# Patient Record
Sex: MALE | Race: WHITE | Employment: OTHER | URBAN - METROPOLITAN AREA
[De-identification: names, ages, dates, MRNs, and addresses within clinical notes are randomized per-mention and may not be internally consistent; named-entity substitution may affect disease eponyms.]

---

## 2017-03-18 ENCOUNTER — OFFICE VISIT (OUTPATIENT)
Dept: FAMILY MEDICINE CLINIC | Facility: CLINIC | Age: 62
End: 2017-03-18

## 2017-03-18 VITALS
RESPIRATION RATE: 16 BRPM | SYSTOLIC BLOOD PRESSURE: 124 MMHG | WEIGHT: 204 LBS | TEMPERATURE: 98 F | BODY MASS INDEX: 27.63 KG/M2 | HEIGHT: 72 IN | DIASTOLIC BLOOD PRESSURE: 80 MMHG | HEART RATE: 70 BPM

## 2017-03-18 DIAGNOSIS — M79.671 PAIN IN BOTH FEET: ICD-10-CM

## 2017-03-18 DIAGNOSIS — L53.9 TOE ERYTHEMA: Primary | ICD-10-CM

## 2017-03-18 DIAGNOSIS — M79.672 PAIN IN BOTH FEET: ICD-10-CM

## 2017-03-18 DIAGNOSIS — I10 ESSENTIAL HYPERTENSION: ICD-10-CM

## 2017-03-18 DIAGNOSIS — M79.89 TOE SWELLING: ICD-10-CM

## 2017-03-18 PROCEDURE — 99214 OFFICE O/P EST MOD 30 MIN: CPT | Performed by: FAMILY MEDICINE

## 2017-03-18 NOTE — PROGRESS NOTES
Victoriano Curry is a 59year old male. cc pain in the feet, swelling, erythema, hypertension  HPI:   Patient is coming for evaluation of his feet condition. He noticed that since the wintertime he has sensation of swelling if his toe.   They feel bruised tin normal mood    EXAM:   /80 mmHg  Pulse 70  Temp(Src) 97.8 °F (36.6 °C) (Oral)  Resp 16  Ht 72\"  Wt 204 lb  BMI 27.66 kg/m2  GENERAL: well developed, well nourished,in no apparent distress  SKIN: no rashes,no suspicious lesions  HEENT: atraumatic, no

## 2017-03-18 NOTE — PATIENT INSTRUCTIONS
Return for blood work due in the morning. Call [phone] to schedule BRIGITTE testing ankle-brachial index. Wear warm socks especially will socks when going outside for a walk. Good hydration. Continue current medication for blood pressure.

## 2017-03-20 ENCOUNTER — HOSPITAL ENCOUNTER (OUTPATIENT)
Dept: ULTRASOUND IMAGING | Facility: HOSPITAL | Age: 62
Discharge: HOME | End: 2017-03-20
Attending: FAMILY MEDICINE
Payer: COMMERCIAL

## 2017-03-20 DIAGNOSIS — I10 ESSENTIAL HYPERTENSION: ICD-10-CM

## 2017-03-20 DIAGNOSIS — L53.9 TOE ERYTHEMA: ICD-10-CM

## 2017-03-20 DIAGNOSIS — M79.672 PAIN IN BOTH FEET: ICD-10-CM

## 2017-03-20 DIAGNOSIS — M79.671 PAIN IN BOTH FEET: ICD-10-CM

## 2017-03-20 DIAGNOSIS — M79.89 TOE SWELLING: ICD-10-CM

## 2017-03-20 PROCEDURE — 93922 UPR/L XTREMITY ART 2 LEVELS: CPT

## 2017-04-13 ENCOUNTER — OFFICE VISIT (OUTPATIENT)
Dept: FAMILY MEDICINE CLINIC | Facility: CLINIC | Age: 62
End: 2017-04-13

## 2017-04-13 ENCOUNTER — LAB ENCOUNTER (OUTPATIENT)
Dept: LAB | Age: 62
End: 2017-04-13
Attending: FAMILY MEDICINE
Payer: COMMERCIAL

## 2017-04-13 VITALS
TEMPERATURE: 97 F | HEIGHT: 72.5 IN | SYSTOLIC BLOOD PRESSURE: 114 MMHG | HEART RATE: 59 BPM | DIASTOLIC BLOOD PRESSURE: 68 MMHG | BODY MASS INDEX: 26.8 KG/M2 | WEIGHT: 200 LBS | RESPIRATION RATE: 16 BRPM

## 2017-04-13 DIAGNOSIS — M79.671 RIGHT FOOT PAIN: ICD-10-CM

## 2017-04-13 DIAGNOSIS — L53.9 TOE ERYTHEMA: ICD-10-CM

## 2017-04-13 DIAGNOSIS — I10 ESSENTIAL HYPERTENSION: ICD-10-CM

## 2017-04-13 DIAGNOSIS — I10 ESSENTIAL HYPERTENSION, MALIGNANT: ICD-10-CM

## 2017-04-13 DIAGNOSIS — M79.672 LEFT FOOT PAIN: ICD-10-CM

## 2017-04-13 DIAGNOSIS — L53.9 TOE ERYTHEMA: Primary | ICD-10-CM

## 2017-04-13 DIAGNOSIS — M79.89 TOE SWELLING: ICD-10-CM

## 2017-04-13 DIAGNOSIS — M79.671 PAIN IN BOTH FEET: ICD-10-CM

## 2017-04-13 DIAGNOSIS — R73.9 HYPERGLYCEMIA: ICD-10-CM

## 2017-04-13 DIAGNOSIS — L53.9 ERYTHEMATOUS CONDITION: Primary | ICD-10-CM

## 2017-04-13 DIAGNOSIS — M79.672 PAIN IN BOTH FEET: ICD-10-CM

## 2017-04-13 DIAGNOSIS — M79.89 SWELLING OF LIMB: ICD-10-CM

## 2017-04-13 PROCEDURE — 86140 C-REACTIVE PROTEIN: CPT | Performed by: FAMILY MEDICINE

## 2017-04-13 PROCEDURE — 85652 RBC SED RATE AUTOMATED: CPT | Performed by: FAMILY MEDICINE

## 2017-04-13 PROCEDURE — 36415 COLL VENOUS BLD VENIPUNCTURE: CPT | Performed by: FAMILY MEDICINE

## 2017-04-13 PROCEDURE — 99214 OFFICE O/P EST MOD 30 MIN: CPT | Performed by: FAMILY MEDICINE

## 2017-04-13 PROCEDURE — 80053 COMPREHEN METABOLIC PANEL: CPT | Performed by: FAMILY MEDICINE

## 2017-04-13 PROCEDURE — 86431 RHEUMATOID FACTOR QUANT: CPT | Performed by: FAMILY MEDICINE

## 2017-04-13 PROCEDURE — 86225 DNA ANTIBODY NATIVE: CPT | Performed by: FAMILY MEDICINE

## 2017-04-13 PROCEDURE — 85025 COMPLETE CBC W/AUTO DIFF WBC: CPT | Performed by: FAMILY MEDICINE

## 2017-04-13 PROCEDURE — 86038 ANTINUCLEAR ANTIBODIES: CPT | Performed by: FAMILY MEDICINE

## 2017-04-13 RX ORDER — LISINOPRIL 10 MG/1
10 TABLET ORAL DAILY
Qty: 90 TABLET | Refills: 1 | Status: SHIPPED | OUTPATIENT
Start: 2017-04-13 | End: 2017-10-07

## 2017-04-13 NOTE — PROGRESS NOTES
Sheba Mathews is a 59year old male. cc hypertension, skin changes of the feet, cold feet  HPI:   HTN - doing well with medications. No side effects. BP is controlled at home. Takes meds regularly. Needs refills. No chest pain, No SOB, no palpitations. headaches  Skeletal erythema toes bilateral feet  Psych normal mood.     EXAM:   /68 mmHg  Pulse 59  Temp(Src) 97.3 °F (36.3 °C) (Oral)  Resp 16  Ht 72.5\"  Wt 200 lb  BMI 26.74 kg/m2  GENERAL: well developed, well nourished,in no apparent distress  S ASSESSMENT AND PLAN:   Toe erythema  (primary encounter diagnosis)  Essential hypertension  Hyperglycemia    No orders of the defined types were placed in this encounter.        Meds & Refills for this Visit:  Signed Prescriptions Disp Refills    kelsey

## 2017-04-13 NOTE — PATIENT INSTRUCTIONS
Continue current meds. Watch diet for fats and carbs. Stay active. Return for additional blood work for rheumatologic tests.

## 2017-04-14 ENCOUNTER — TELEPHONE (OUTPATIENT)
Dept: FAMILY MEDICINE CLINIC | Facility: CLINIC | Age: 62
End: 2017-04-14

## 2017-04-14 ENCOUNTER — PATIENT MESSAGE (OUTPATIENT)
Dept: FAMILY MEDICINE CLINIC | Facility: CLINIC | Age: 62
End: 2017-04-14

## 2017-04-14 DIAGNOSIS — E87.5 HIGH SERUM POTASSIUM LEVEL: Primary | ICD-10-CM

## 2017-04-14 NOTE — TELEPHONE ENCOUNTER
From: Adeline Mora  To: Jimi Botello MD  Sent: 4/14/2017 7:39 AM CDT  Subject: Test Results Question    I saw the results from my blood tests. I observed that I had an elevated potassium level.  In light of the fact that late last year I had some b

## 2017-04-14 NOTE — TELEPHONE ENCOUNTER
Elevated potassium is most probably related to patient not drinking before the testing. We will just recheck it. I do not think that blood  in the urine have correlation with that.

## 2017-04-21 ENCOUNTER — APPOINTMENT (OUTPATIENT)
Dept: LAB | Facility: HOSPITAL | Age: 62
End: 2017-04-21
Attending: FAMILY MEDICINE
Payer: COMMERCIAL

## 2017-04-21 DIAGNOSIS — E87.5 HIGH SERUM POTASSIUM LEVEL: ICD-10-CM

## 2017-04-21 PROCEDURE — 80048 BASIC METABOLIC PNL TOTAL CA: CPT

## 2017-04-21 PROCEDURE — 36415 COLL VENOUS BLD VENIPUNCTURE: CPT

## 2017-05-01 ENCOUNTER — TELEPHONE (OUTPATIENT)
Dept: FAMILY MEDICINE CLINIC | Facility: CLINIC | Age: 62
End: 2017-05-01

## 2017-06-18 ENCOUNTER — HOSPITAL ENCOUNTER (OUTPATIENT)
Age: 62
Discharge: HOME | End: 2017-06-18
Payer: COMMERCIAL

## 2017-06-18 VITALS
BODY MASS INDEX: 26.41 KG/M2 | TEMPERATURE: 98 F | RESPIRATION RATE: 18 BRPM | OXYGEN SATURATION: 99 % | WEIGHT: 195 LBS | HEIGHT: 72 IN | SYSTOLIC BLOOD PRESSURE: 127 MMHG | HEART RATE: 60 BPM | DIASTOLIC BLOOD PRESSURE: 73 MMHG

## 2017-06-18 DIAGNOSIS — J01.00 ACUTE NON-RECURRENT MAXILLARY SINUSITIS: Primary | ICD-10-CM

## 2017-06-18 PROCEDURE — 99214 OFFICE O/P EST MOD 30 MIN: CPT

## 2017-06-18 PROCEDURE — 99213 OFFICE O/P EST LOW 20 MIN: CPT

## 2017-06-18 RX ORDER — FLUTICASONE PROPIONATE 50 MCG
2 SPRAY, SUSPENSION (ML) NASAL DAILY
Qty: 16 G | Refills: 0 | Status: SHIPPED | OUTPATIENT
Start: 2017-06-18 | End: 2017-07-18

## 2017-06-18 RX ORDER — AMOXICILLIN AND CLAVULANATE POTASSIUM 875; 125 MG/1; MG/1
1 TABLET, FILM COATED ORAL 2 TIMES DAILY
Qty: 20 TABLET | Refills: 0 | Status: SHIPPED | OUTPATIENT
Start: 2017-06-18 | End: 2017-06-28

## 2017-06-18 NOTE — ED PROVIDER NOTES
Patient Seen in: [facility]    History   Patient presents with:  Sinus Problem    Stated Complaint: sinus pain x1 day     HPI    Kushal Bowers is a 58-year-old male who presents with his wife today for evaluation of sinus pain to the rig Parkinsons[other] [OTHER] Mother    • parkinsons[other] [OTHER] Maternal Grandmother    • Heart Disease Paternal Grandfather    • Other[other] [OTHER] Paternal Grandfather      aneurysm   • Breast Cancer       Siblings   • Cancer Maternal Grandfather regular rhythm, normal heart sounds and intact distal pulses. Pulmonary/Chest: Effort normal and breath sounds normal.   Abdominal: Soft. Bowel sounds are normal.   Neurological: He is alert and oriented to person, place, and time.    Skin: Skin is warm

## 2017-09-10 ENCOUNTER — HOSPITAL ENCOUNTER (OUTPATIENT)
Age: 62
Discharge: HOME | End: 2017-09-10
Payer: COMMERCIAL

## 2017-09-10 VITALS
SYSTOLIC BLOOD PRESSURE: 130 MMHG | TEMPERATURE: 98 F | DIASTOLIC BLOOD PRESSURE: 71 MMHG | OXYGEN SATURATION: 98 % | RESPIRATION RATE: 16 BRPM | HEART RATE: 58 BPM

## 2017-09-10 DIAGNOSIS — J01.00 ACUTE NON-RECURRENT MAXILLARY SINUSITIS: Primary | ICD-10-CM

## 2017-09-10 PROCEDURE — 99213 OFFICE O/P EST LOW 20 MIN: CPT

## 2017-09-10 PROCEDURE — 99214 OFFICE O/P EST MOD 30 MIN: CPT

## 2017-09-10 RX ORDER — AMOXICILLIN AND CLAVULANATE POTASSIUM 875; 125 MG/1; MG/1
1 TABLET, FILM COATED ORAL 2 TIMES DAILY
Qty: 20 TABLET | Refills: 0 | Status: SHIPPED | OUTPATIENT
Start: 2017-09-10 | End: 2017-09-20

## 2017-09-10 NOTE — ED INITIAL ASSESSMENT (HPI)
C/O throbbing/pressure to right side of the face that causing teeth and jaw pain. Same symptoms last June 2017 dx with Sinusitis.

## 2017-09-10 NOTE — ED PROVIDER NOTES
Patient Seen in: [facility]    History   Patient presents with:  Sinus Problem    Stated Complaint: SINUS PAIN     HPI    Brain Precise is a 25-year-old male who presents today for evaluation of right-sided facial and dental pain.   He ha Alcohol use: No                Review of Systems    Positive for stated complaint: SINUS PAIN   Other systems are as noted in HPI. Constitutional and vital signs reviewed. All other systems reviewed and negative except as noted above.     PSFH to follow up with his dentist and PCP, especially if his pain persists, or if these symptoms recur. The patient is encouraged to return if any concerning symptoms arise. Additional verbal discharge instructions are given and discussed.   Discharge medicati

## 2017-10-10 RX ORDER — LISINOPRIL 10 MG/1
TABLET ORAL
Qty: 90 TABLET | Refills: 0 | Status: SHIPPED | OUTPATIENT
Start: 2017-10-10 | End: 2017-12-04

## 2017-12-04 ENCOUNTER — OFFICE VISIT (OUTPATIENT)
Dept: FAMILY MEDICINE CLINIC | Facility: CLINIC | Age: 62
End: 2017-12-04

## 2017-12-04 VITALS
DIASTOLIC BLOOD PRESSURE: 50 MMHG | BODY MASS INDEX: 26.66 KG/M2 | RESPIRATION RATE: 16 BRPM | SYSTOLIC BLOOD PRESSURE: 100 MMHG | HEIGHT: 72.5 IN | TEMPERATURE: 97 F | HEART RATE: 58 BPM | WEIGHT: 199 LBS

## 2017-12-04 DIAGNOSIS — Z00.00 LABORATORY EXAMINATION ORDERED AS PART OF A ROUTINE GENERAL MEDICAL EXAMINATION: ICD-10-CM

## 2017-12-04 DIAGNOSIS — Z12.5 SCREENING FOR PROSTATE CANCER: ICD-10-CM

## 2017-12-04 DIAGNOSIS — Z13.89 SCREENING FOR GENITOURINARY CONDITION: ICD-10-CM

## 2017-12-04 DIAGNOSIS — Z23 NEED FOR VACCINATION: ICD-10-CM

## 2017-12-04 DIAGNOSIS — Z00.00 PHYSICAL EXAM, ANNUAL: Primary | ICD-10-CM

## 2017-12-04 PROCEDURE — 81003 URINALYSIS AUTO W/O SCOPE: CPT | Performed by: FAMILY MEDICINE

## 2017-12-04 PROCEDURE — 90686 IIV4 VACC NO PRSV 0.5 ML IM: CPT | Performed by: FAMILY MEDICINE

## 2017-12-04 PROCEDURE — 99396 PREV VISIT EST AGE 40-64: CPT | Performed by: FAMILY MEDICINE

## 2017-12-04 PROCEDURE — 90471 IMMUNIZATION ADMIN: CPT | Performed by: FAMILY MEDICINE

## 2017-12-04 RX ORDER — LISINOPRIL 10 MG/1
TABLET ORAL
Qty: 30 TABLET | Refills: 5 | Status: SHIPPED | OUTPATIENT
Start: 2017-12-04 | End: 2018-05-29

## 2017-12-04 NOTE — PROGRESS NOTES
Jaswant Bansal is a 57year old male who presents for a complete physical exam.   HPI:   Pt complains of having occasional stool incontinence. He had sphincterotomy done in the past.  A little bit of the irritation at times.   Colonoscopy will be due next Parkinsons[other] [OTHER] Mother    • parkinsons[other] [OTHER] Maternal Grandmother    • Heart Disease Paternal Grandfather    • Other[other] [OTHER] Paternal Grandfather      aneurysm   • Breast Cancer       Siblings   • Cancer Maternal Grandfather hernia,no penile lesions  RECTAL: good rectal tone, prostate shows no masses, mildly enlarged.   MUSCULOSKELETAL: back is not tender,FROM of the back  EXTREMITIES: no cyanosis, clubbing or edema  NEURO: Oriented times three,cranial nerves are intact,motor a

## 2017-12-04 NOTE — PATIENT INSTRUCTIONS
Zostavax - sningles shot. Healthy diet. Stay active. Follow-up for medication check in May 2018. Anibal Everett Return for fasting blood work.

## 2017-12-07 ENCOUNTER — TELEPHONE (OUTPATIENT)
Dept: FAMILY MEDICINE CLINIC | Facility: CLINIC | Age: 62
End: 2017-12-07

## 2017-12-07 ENCOUNTER — LAB ENCOUNTER (OUTPATIENT)
Dept: LAB | Facility: HOSPITAL | Age: 62
End: 2017-12-07
Attending: FAMILY MEDICINE
Payer: COMMERCIAL

## 2017-12-07 DIAGNOSIS — E87.5 SERUM POTASSIUM ELEVATED: Primary | ICD-10-CM

## 2017-12-07 DIAGNOSIS — Z12.5 SCREENING FOR PROSTATE CANCER: ICD-10-CM

## 2017-12-07 DIAGNOSIS — Z00.00 LABORATORY EXAMINATION ORDERED AS PART OF A ROUTINE GENERAL MEDICAL EXAMINATION: ICD-10-CM

## 2017-12-07 PROCEDURE — 84443 ASSAY THYROID STIM HORMONE: CPT

## 2017-12-07 PROCEDURE — 84153 ASSAY OF PSA TOTAL: CPT

## 2017-12-07 PROCEDURE — 85025 COMPLETE CBC W/AUTO DIFF WBC: CPT

## 2017-12-07 PROCEDURE — 36415 COLL VENOUS BLD VENIPUNCTURE: CPT

## 2017-12-07 PROCEDURE — 80053 COMPREHEN METABOLIC PANEL: CPT

## 2017-12-07 PROCEDURE — 80061 LIPID PANEL: CPT

## 2017-12-07 NOTE — PROGRESS NOTES
Left a detailed message for the patient to call back to receive his results and recommendations to repeat his BMP. I will send a [employer] message to the patient to contact me back as soon as he can.

## 2017-12-11 ENCOUNTER — APPOINTMENT (OUTPATIENT)
Dept: LAB | Facility: HOSPITAL | Age: 62
End: 2017-12-11
Attending: FAMILY MEDICINE
Payer: COMMERCIAL

## 2017-12-11 DIAGNOSIS — E87.5 SERUM POTASSIUM ELEVATED: ICD-10-CM

## 2017-12-11 PROCEDURE — 36415 COLL VENOUS BLD VENIPUNCTURE: CPT

## 2017-12-11 PROCEDURE — 80048 BASIC METABOLIC PNL TOTAL CA: CPT

## 2017-12-12 ENCOUNTER — TELEPHONE (OUTPATIENT)
Dept: FAMILY MEDICINE CLINIC | Facility: CLINIC | Age: 62
End: 2017-12-12

## 2017-12-12 DIAGNOSIS — Z86.39 HISTORY OF HYPERKALEMIA: Primary | ICD-10-CM

## 2018-01-09 ENCOUNTER — TELEPHONE (OUTPATIENT)
Dept: FAMILY MEDICINE CLINIC | Facility: CLINIC | Age: 63
End: 2018-01-09

## 2018-01-09 RX ORDER — BENZONATATE 100 MG/1
100 CAPSULE ORAL 3 TIMES DAILY PRN
Qty: 15 CAPSULE | Refills: 0 | Status: SHIPPED | OUTPATIENT
Start: 2018-01-09 | End: 2018-06-11 | Stop reason: ALTCHOICE

## 2018-01-09 NOTE — TELEPHONE ENCOUNTER
Outgoing call to patient, states he has had a cough for the last 2 days, requesting a prescription for tessalon perls. No fever, no sob, no wheezing, no body aches.   Patient advised that Dr. Aram Dill is out of office today, however I can make an appoin

## 2018-01-09 NOTE — TELEPHONE ENCOUNTER
I have called in 15 capsules of Tessalon perles to [facility]. We usually do not prescribe medications without being seen.   I would like patient to keep in mind that if his cough is not better he needs evaluation in the office to make sure that we are not

## 2018-01-09 NOTE — TELEPHONE ENCOUNTER
Patient states that he wants to talk to Dr. Stephanie Bell nurse. Made aware that they are not in office today. States he has had a cough x 2 days and he is wanting a medication called in. I explained that he would have to be seen.   He stated \"are you s

## 2018-01-10 ENCOUNTER — OFFICE VISIT (OUTPATIENT)
Dept: FAMILY MEDICINE CLINIC | Facility: CLINIC | Age: 63
End: 2018-01-10

## 2018-01-10 ENCOUNTER — HOSPITAL ENCOUNTER (OUTPATIENT)
Dept: GENERAL RADIOLOGY | Facility: HOSPITAL | Age: 63
Discharge: HOME | End: 2018-01-10
Attending: FAMILY MEDICINE
Payer: COMMERCIAL

## 2018-01-10 VITALS
DIASTOLIC BLOOD PRESSURE: 60 MMHG | HEART RATE: 77 BPM | SYSTOLIC BLOOD PRESSURE: 120 MMHG | OXYGEN SATURATION: 99 % | BODY MASS INDEX: 27.46 KG/M2 | RESPIRATION RATE: 16 BRPM | WEIGHT: 200.5 LBS | TEMPERATURE: 99 F | HEIGHT: 71.5 IN

## 2018-01-10 DIAGNOSIS — R05.9 COUGH: Primary | ICD-10-CM

## 2018-01-10 DIAGNOSIS — R09.81 NASAL CONGESTION: ICD-10-CM

## 2018-01-10 DIAGNOSIS — R05.9 COUGH: ICD-10-CM

## 2018-01-10 DIAGNOSIS — J98.01 BRONCHOSPASM: ICD-10-CM

## 2018-01-10 PROCEDURE — 71046 X-RAY EXAM CHEST 2 VIEWS: CPT | Performed by: FAMILY MEDICINE

## 2018-01-10 PROCEDURE — 99214 OFFICE O/P EST MOD 30 MIN: CPT | Performed by: FAMILY MEDICINE

## 2018-01-10 RX ORDER — AZITHROMYCIN 250 MG/1
TABLET, FILM COATED ORAL
Qty: 6 TABLET | Refills: 0 | Status: SHIPPED | OUTPATIENT
Start: 2018-01-10 | End: 2018-06-11 | Stop reason: ALTCHOICE

## 2018-01-10 NOTE — PATIENT INSTRUCTIONS
Do chest x-ray. Start inhaler 1 puff twice a day. Keep good hydration. Continue Tessalon Perles as needed. Use Coricidin over-the-counter as needed.     For nasal congestion you could try Flonase it is available over-the-counter to space nostril once a

## 2018-01-10 NOTE — PROGRESS NOTES
Danitza Al is a 57year old male. cc cough, chest congestion  HPI:   Patient is coming to the office complaining of having chest congestion which started in December. He was trying to exercise more was biking a lot.   Full some congestion on the right s Left arm, Patient Position: Sitting, Cuff Size: adult)   Pulse 77   Temp 99.1 °F (37.3 °C) (Oral)   Resp 16   Ht 71.5\"   Wt 200 lb 8 oz   SpO2 99%   BMI 27.57 kg/m²   GENERAL: well developed, well nourished,in no apparent distress  SKIN: no rashes,no susp

## 2018-05-29 RX ORDER — LISINOPRIL 10 MG/1
TABLET ORAL
Qty: 30 TABLET | Refills: 0 | Status: SHIPPED | OUTPATIENT
Start: 2018-05-29 | End: 2018-06-29

## 2018-06-29 RX ORDER — LISINOPRIL 10 MG/1
TABLET ORAL
Qty: 60 TABLET | Refills: 0 | Status: SHIPPED | OUTPATIENT
Start: 2018-06-29 | End: 2018-08-29

## 2018-08-30 RX ORDER — LISINOPRIL 10 MG/1
TABLET ORAL
Qty: 60 TABLET | Refills: 0 | Status: SHIPPED | OUTPATIENT
Start: 2018-08-30 | End: 2018-10-31

## 2018-11-02 ENCOUNTER — TELEPHONE (OUTPATIENT)
Dept: FAMILY MEDICINE CLINIC | Facility: CLINIC | Age: 63
End: 2018-11-02

## 2018-11-02 NOTE — TELEPHONE ENCOUNTER
Pt wants a call back as to why he needs to come in for a med check for his lisinopril. Pt feels it's unfair for us not to refill his medication since he's on it for life. I explained to him that once he books an appt he will get his refill.     Please also

## 2018-11-03 ENCOUNTER — PATIENT MESSAGE (OUTPATIENT)
Dept: FAMILY MEDICINE CLINIC | Facility: CLINIC | Age: 63
End: 2018-11-03

## 2018-11-05 RX ORDER — LISINOPRIL 10 MG/1
TABLET ORAL
Qty: 60 TABLET | Refills: 0 | Status: SHIPPED | OUTPATIENT
Start: 2018-11-05 | End: 2018-12-19

## 2018-11-05 NOTE — TELEPHONE ENCOUNTER
From: Miriam Mora  To: Joseph Burleson MD  Sent: 11/3/2018 6:37 PM CDT  Subject: Prescription Question    I have been taking Lisinopril 10 mg for about three years. It has successfully controlled my hypertension without any perceptible ill-effects.

## 2018-11-07 RX ORDER — LISINOPRIL 10 MG/1
TABLET ORAL
Qty: 60 TABLET | Refills: 0 | OUTPATIENT
Start: 2018-11-07

## 2018-12-19 ENCOUNTER — OFFICE VISIT (OUTPATIENT)
Dept: FAMILY MEDICINE CLINIC | Facility: CLINIC | Age: 63
End: 2018-12-19
Payer: MEDICARE

## 2018-12-19 VITALS
HEART RATE: 75 BPM | DIASTOLIC BLOOD PRESSURE: 80 MMHG | WEIGHT: 209 LBS | SYSTOLIC BLOOD PRESSURE: 112 MMHG | BODY MASS INDEX: 28.31 KG/M2 | HEIGHT: 72 IN | RESPIRATION RATE: 16 BRPM | TEMPERATURE: 98 F

## 2018-12-19 DIAGNOSIS — I10 ESSENTIAL HYPERTENSION: ICD-10-CM

## 2018-12-19 DIAGNOSIS — Z00.00 ENCOUNTER FOR ANNUAL HEALTH EXAMINATION: Primary | ICD-10-CM

## 2018-12-19 DIAGNOSIS — E78.6 LOW HDL (UNDER 40): ICD-10-CM

## 2018-12-19 DIAGNOSIS — N40.0 PROSTATE ENLARGEMENT: ICD-10-CM

## 2018-12-19 DIAGNOSIS — R73.9 HYPERGLYCEMIA: ICD-10-CM

## 2018-12-19 PROCEDURE — G0402 INITIAL PREVENTIVE EXAM: HCPCS | Performed by: FAMILY MEDICINE

## 2018-12-19 PROCEDURE — 99213 OFFICE O/P EST LOW 20 MIN: CPT | Performed by: FAMILY MEDICINE

## 2018-12-19 PROCEDURE — G0009 ADMIN PNEUMOCOCCAL VACCINE: HCPCS | Performed by: FAMILY MEDICINE

## 2018-12-19 PROCEDURE — G0008 ADMIN INFLUENZA VIRUS VAC: HCPCS | Performed by: FAMILY MEDICINE

## 2018-12-19 PROCEDURE — 90653 IIV ADJUVANT VACCINE IM: CPT | Performed by: FAMILY MEDICINE

## 2018-12-19 PROCEDURE — 90670 PCV13 VACCINE IM: CPT | Performed by: FAMILY MEDICINE

## 2018-12-19 RX ORDER — LISINOPRIL 10 MG/1
TABLET ORAL
Qty: 30 TABLET | Refills: 5 | Status: SHIPPED | OUTPATIENT
Start: 2018-12-19 | End: 2019-07-02

## 2018-12-19 NOTE — PATIENT INSTRUCTIONS
Continue current meds. Watch diet for fats and carbs. Stay active. Return for fasting blood tests.      Hodan Solomon's SCREENING SCHEDULE   Tests on this list are recommended by your physician but may not be covered, or covered at this frequency, by Anyone with a family history    Colorectal Cancer Screening Covered up to Age 76     Colonoscopy Screen   Covered every 10 years- more often if abnormal Colonoscopy due on 09/04/2028 Update [facility] if applicable    Flex Sigmoidoscopy Screen  Cov men   Illicit injectable drug abusers     Tetanus Toxoid- Only covered with a cut with metal- TD and TDaP Not covered by Medicare Part B) Orders placed or performed in visit on 11/13/15   • TETANUS, DIPHTHERIA TOXOIDS AND ACELLULAR PERTUSIS VACCINE (TDAP),

## 2018-12-19 NOTE — PROGRESS NOTES
HPI:   Darrius Mix is a 70year old male who presents for a Medicare Initial Preventative Physical Exam (Welcome to Medicare- < 12 months on Medicare), hypertension, prostate enlargement, hyperglycemia.     Hypertension blood pressure stable on lisinop Dyslipidemia     Elevated hemoglobin (HCC)     Squamous cell carcinoma of skin of lower extremity    Wt Readings from Last 3 Encounters:  12/19/18 : 209 lb  06/11/18 : 200 lb  01/10/18 : 200 lb 8 oz     Last Cholesterol Labs:   Lab Results   Component Valu or ST  LUNGS: denies shortness of breath with exertion  CARDIOVASCULAR: denies chest pain on exertion  GI: denies abdominal pain, denies heartburn  : 1 per night nocturia, no complaint of urinary incontinence  MUSCULOSKELETAL: denies back pain  NEURO: de Regular rate and rhythm, S1, S2 normal, no murmur,    Abdomen:   Soft, non-tender, bowel sounds active all four quadrants,  no masses, no organomegaly   Genitalia: Normal male   Rectal: Normal tone, mildly enlarged prostate, no masses or tenderness   Extre reviewed. Diet assessment: good     PLAN:  Continue current meds. Watch diet for fats and carbs. Stay active. Return for fasting blood tests. The patient indicates understanding of these issues and agrees to the plan.       Reinforced healthy > 65 No flowsheet data found.     Prostate Cancer Screening      PSA  Annually PSA due on 12/07/2019  Update Health Maintenance if applicable     Immunizations (Update Immunization Activity if applicable)     Influenza  Covered Annually 12/4/2017

## 2018-12-28 ENCOUNTER — LAB ENCOUNTER (OUTPATIENT)
Dept: LAB | Age: 63
End: 2018-12-28
Attending: FAMILY MEDICINE
Payer: MEDICARE

## 2018-12-28 DIAGNOSIS — N40.0 PROSTATE ENLARGEMENT: ICD-10-CM

## 2018-12-28 DIAGNOSIS — I10 ESSENTIAL HYPERTENSION: ICD-10-CM

## 2018-12-28 PROCEDURE — 84153 ASSAY OF PSA TOTAL: CPT

## 2018-12-28 PROCEDURE — 85025 COMPLETE CBC W/AUTO DIFF WBC: CPT

## 2018-12-28 PROCEDURE — 80053 COMPREHEN METABOLIC PANEL: CPT

## 2018-12-28 PROCEDURE — 81003 URINALYSIS AUTO W/O SCOPE: CPT

## 2018-12-28 PROCEDURE — 36415 COLL VENOUS BLD VENIPUNCTURE: CPT

## 2019-02-04 PROBLEM — C44.712 BASAL CELL CARCINOMA (BCC) OF RIGHT LOWER LEG: Status: ACTIVE | Noted: 2019-02-04

## 2019-03-11 ENCOUNTER — LAB ENCOUNTER (OUTPATIENT)
Dept: LAB | Age: 64
End: 2019-03-11
Attending: UROLOGY
Payer: MEDICARE

## 2019-03-11 DIAGNOSIS — R97.20 ELEVATED PSA: ICD-10-CM

## 2019-03-11 DIAGNOSIS — R89.9 ABNORMAL LABORATORY TEST RESULT: ICD-10-CM

## 2019-03-11 LAB — PSA SERPL-MCNC: 3.68 NG/ML (ref ?–4)

## 2019-03-11 PROCEDURE — 84153 ASSAY OF PSA TOTAL: CPT

## 2019-03-11 PROCEDURE — 36415 COLL VENOUS BLD VENIPUNCTURE: CPT

## 2019-06-05 PROBLEM — Z85.828 PERSONAL HISTORY OF MALIGNANT NEOPLASM OF SKIN: Status: ACTIVE | Noted: 2019-06-05

## 2019-07-02 DIAGNOSIS — I10 ESSENTIAL HYPERTENSION: ICD-10-CM

## 2019-07-02 RX ORDER — LISINOPRIL 10 MG/1
TABLET ORAL
Qty: 30 TABLET | Refills: 0 | Status: SHIPPED | OUTPATIENT
Start: 2019-07-02 | End: 2019-08-01

## 2019-08-01 ENCOUNTER — LAB ENCOUNTER (OUTPATIENT)
Dept: LAB | Age: 64
End: 2019-08-01
Attending: FAMILY MEDICINE
Payer: MEDICARE

## 2019-08-01 ENCOUNTER — OFFICE VISIT (OUTPATIENT)
Dept: FAMILY MEDICINE CLINIC | Facility: CLINIC | Age: 64
End: 2019-08-01
Payer: MEDICARE

## 2019-08-01 VITALS
TEMPERATURE: 98 F | DIASTOLIC BLOOD PRESSURE: 62 MMHG | RESPIRATION RATE: 18 BRPM | OXYGEN SATURATION: 98 % | HEIGHT: 72 IN | SYSTOLIC BLOOD PRESSURE: 114 MMHG | WEIGHT: 214 LBS | HEART RATE: 60 BPM | BODY MASS INDEX: 28.99 KG/M2

## 2019-08-01 DIAGNOSIS — R97.20 ELEVATED PSA: ICD-10-CM

## 2019-08-01 DIAGNOSIS — D58.2 ELEVATED HEMOGLOBIN (HCC): ICD-10-CM

## 2019-08-01 DIAGNOSIS — I10 ESSENTIAL HYPERTENSION: ICD-10-CM

## 2019-08-01 DIAGNOSIS — R05.9 COUGH: ICD-10-CM

## 2019-08-01 DIAGNOSIS — I10 ESSENTIAL HYPERTENSION: Primary | ICD-10-CM

## 2019-08-01 DIAGNOSIS — R89.9 ABNORMAL LABORATORY TEST RESULT: ICD-10-CM

## 2019-08-01 LAB
ALBUMIN SERPL-MCNC: 4.1 G/DL (ref 3.4–5)
ALBUMIN/GLOB SERPL: 1 {RATIO} (ref 1–2)
ALP LIVER SERPL-CCNC: 78 U/L (ref 45–117)
ALT SERPL-CCNC: 26 U/L (ref 16–61)
ANION GAP SERPL CALC-SCNC: 4 MMOL/L (ref 0–18)
AST SERPL-CCNC: 19 U/L (ref 15–37)
BASOPHILS # BLD AUTO: 0.05 X10(3) UL (ref 0–0.2)
BASOPHILS NFR BLD AUTO: 0.7 %
BILIRUB SERPL-MCNC: 0.7 MG/DL (ref 0.1–2)
BUN BLD-MCNC: 22 MG/DL (ref 7–18)
BUN/CREAT SERPL: 21.6 (ref 10–20)
CALCIUM BLD-MCNC: 9.4 MG/DL (ref 8.5–10.1)
CHLORIDE SERPL-SCNC: 108 MMOL/L (ref 98–112)
CO2 SERPL-SCNC: 29 MMOL/L (ref 21–32)
CREAT BLD-MCNC: 1.02 MG/DL (ref 0.7–1.3)
DEPRECATED RDW RBC AUTO: 45.7 FL (ref 35.1–46.3)
EOSINOPHIL # BLD AUTO: 0.09 X10(3) UL (ref 0–0.7)
EOSINOPHIL NFR BLD AUTO: 1.2 %
ERYTHROCYTE [DISTWIDTH] IN BLOOD BY AUTOMATED COUNT: 13.5 % (ref 11–15)
GLOBULIN PLAS-MCNC: 4 G/DL (ref 2.8–4.4)
GLUCOSE BLD-MCNC: 78 MG/DL (ref 70–99)
HCT VFR BLD AUTO: 53.4 % (ref 39–53)
HGB BLD-MCNC: 17.4 G/DL (ref 13–17.5)
IMM GRANULOCYTES # BLD AUTO: 0.01 X10(3) UL (ref 0–1)
IMM GRANULOCYTES NFR BLD: 0.1 %
LYMPHOCYTES # BLD AUTO: 1.96 X10(3) UL (ref 1–4)
LYMPHOCYTES NFR BLD AUTO: 27.2 %
M PROTEIN MFR SERPL ELPH: 8.1 G/DL (ref 6.4–8.2)
MCH RBC QN AUTO: 30.1 PG (ref 26–34)
MCHC RBC AUTO-ENTMCNC: 32.6 G/DL (ref 31–37)
MCV RBC AUTO: 92.4 FL (ref 80–100)
MONOCYTES # BLD AUTO: 0.88 X10(3) UL (ref 0.1–1)
MONOCYTES NFR BLD AUTO: 12.2 %
NEUTROPHILS # BLD AUTO: 4.22 X10 (3) UL (ref 1.5–7.7)
NEUTROPHILS # BLD AUTO: 4.22 X10(3) UL (ref 1.5–7.7)
NEUTROPHILS NFR BLD AUTO: 58.6 %
OSMOLALITY SERPL CALC.SUM OF ELEC: 294 MOSM/KG (ref 275–295)
PLATELET # BLD AUTO: 206 10(3)UL (ref 150–450)
POTASSIUM SERPL-SCNC: 4.5 MMOL/L (ref 3.5–5.1)
RBC # BLD AUTO: 5.78 X10(6)UL (ref 3.8–5.8)
SODIUM SERPL-SCNC: 141 MMOL/L (ref 136–145)
WBC # BLD AUTO: 7.2 X10(3) UL (ref 4–11)

## 2019-08-01 PROCEDURE — 85025 COMPLETE CBC W/AUTO DIFF WBC: CPT

## 2019-08-01 PROCEDURE — 80053 COMPREHEN METABOLIC PANEL: CPT

## 2019-08-01 PROCEDURE — 99214 OFFICE O/P EST MOD 30 MIN: CPT | Performed by: FAMILY MEDICINE

## 2019-08-01 PROCEDURE — 36415 COLL VENOUS BLD VENIPUNCTURE: CPT

## 2019-08-01 RX ORDER — LISINOPRIL 10 MG/1
TABLET ORAL
Qty: 30 TABLET | Refills: 5 | Status: SHIPPED | OUTPATIENT
Start: 2019-08-01 | End: 2020-02-10

## 2019-08-01 NOTE — PATIENT INSTRUCTIONS
Continue lisinopril 10 mg 1 tablet daily. We will call your blood test results when those are back. Inhaler Asmanex 1 puff twice a day for 1 week. Keep good hydration.   If there is nasal congestion try Flonase nasal spray or saline spray over-the-counte

## 2019-12-20 ENCOUNTER — OFFICE VISIT (OUTPATIENT)
Dept: FAMILY MEDICINE CLINIC | Facility: CLINIC | Age: 64
End: 2019-12-20
Payer: MEDICARE

## 2019-12-20 VITALS
WEIGHT: 205 LBS | RESPIRATION RATE: 16 BRPM | HEART RATE: 63 BPM | OXYGEN SATURATION: 99 % | TEMPERATURE: 97 F | SYSTOLIC BLOOD PRESSURE: 116 MMHG | BODY MASS INDEX: 27.77 KG/M2 | HEIGHT: 72 IN | DIASTOLIC BLOOD PRESSURE: 64 MMHG

## 2019-12-20 DIAGNOSIS — I10 ESSENTIAL HYPERTENSION: ICD-10-CM

## 2019-12-20 DIAGNOSIS — R97.20 ELEVATED PSA: ICD-10-CM

## 2019-12-20 DIAGNOSIS — Z23 NEED FOR VACCINATION: ICD-10-CM

## 2019-12-20 DIAGNOSIS — Z00.00 ENCOUNTER FOR ANNUAL HEALTH EXAMINATION: Primary | ICD-10-CM

## 2019-12-20 DIAGNOSIS — R73.9 HYPERGLYCEMIA: ICD-10-CM

## 2019-12-20 DIAGNOSIS — E78.5 DYSLIPIDEMIA: ICD-10-CM

## 2019-12-20 PROCEDURE — G0008 ADMIN INFLUENZA VIRUS VAC: HCPCS | Performed by: FAMILY MEDICINE

## 2019-12-20 PROCEDURE — G0009 ADMIN PNEUMOCOCCAL VACCINE: HCPCS | Performed by: FAMILY MEDICINE

## 2019-12-20 PROCEDURE — 90662 IIV NO PRSV INCREASED AG IM: CPT | Performed by: FAMILY MEDICINE

## 2019-12-20 PROCEDURE — 99213 OFFICE O/P EST LOW 20 MIN: CPT | Performed by: FAMILY MEDICINE

## 2019-12-20 PROCEDURE — G0438 PPPS, INITIAL VISIT: HCPCS | Performed by: FAMILY MEDICINE

## 2019-12-20 PROCEDURE — 90732 PPSV23 VACC 2 YRS+ SUBQ/IM: CPT | Performed by: FAMILY MEDICINE

## 2019-12-20 NOTE — PATIENT INSTRUCTIONS
Continue current meds. Watch diet for fats and carbs. Stay active. Return for fasting blood tests.      Praneeth Solomon's SCREENING SCHEDULE   Tests on this list are recommended by your physician but may not be covered, or covered at this frequency, by Anyone with a family history    Colorectal Cancer Screening Covered up to Age 76     Colonoscopy Screen   Covered every 10 years- more often if abnormal Colonoscopy due on 09/04/2028 Update [facility] if applicable    Flex Sigmoidoscopy Screen  Cov a HepB virus carrier   Homosexual men   Illicit injectable drug abusers     Tetanus Toxoid- Only covered with a cut with metal- TD and TDaP Not covered by Medicare Part B) Orders placed or performed in visit on 11/13/15   • TETANUS, DIPHTHERIA TOXOIDS AND

## 2019-12-20 NOTE — PROGRESS NOTES
HPI:   Jaci Weiss is a 75year old male who presents for a Medicare Initial Preventative Physical Exam (Welcome to Medicare- < 12 months on Medicare), hypertension, prostate enlargement, hyperglycemia.     Hypertension blood pressure stable on lisinop Essential hypertension     Hyperglycemia     Dyslipidemia     Elevated hemoglobin (HCC)     Squamous cell carcinoma of skin of lower extremity     Basal cell carcinoma (BCC) of right lower leg     Personal history of malignant neoplasm of skin    Wt Readin double vision  HEENT: denies nasal congestion, sinus pain or ST  LUNGS: denies shortness of breath with exertion  CARDIOVASCULAR: denies chest pain on exertion  GI: denies abdominal pain, denies heartburn  : 1 per night nocturia, no complaint of urinary Deferred, done by urologist   Extremities: Extremities normal, atraumatic, no cyanosis or edema   Pulses: 2+ and symmetric   Skin: Skin color, texture, turgor normal, no rashes or lesions   Lymph nodes: Cervical, supraclavicular, and axillary nodes normal carbs.   Stay active. Return for fasting blood tests. The patient indicates understanding of these issues and agrees to the plan. Reinforced healthy diet, lifestyle, and exercise. Prescription medication ordered. Lab work ordered.   Continue wi Annually PSA due on 03/11/2021  Update Health Maintenance if applicable     Immunizations (Update Immunization Activity if applicable)     Influenza  Covered Annually 12/20/2019   Please get every year    Pneumococcal 13 (Prevnar)  Covered Once after 65 No

## 2019-12-31 ENCOUNTER — LAB ENCOUNTER (OUTPATIENT)
Dept: LAB | Age: 64
End: 2019-12-31
Attending: FAMILY MEDICINE
Payer: MEDICARE

## 2019-12-31 DIAGNOSIS — I10 ESSENTIAL HYPERTENSION: ICD-10-CM

## 2019-12-31 DIAGNOSIS — R73.9 HYPERGLYCEMIA: ICD-10-CM

## 2019-12-31 DIAGNOSIS — R97.20 ELEVATED PSA: ICD-10-CM

## 2019-12-31 DIAGNOSIS — E78.5 DYSLIPIDEMIA: ICD-10-CM

## 2019-12-31 LAB
ALBUMIN SERPL-MCNC: 3.6 G/DL (ref 3.4–5)
ALBUMIN/GLOB SERPL: 0.9 {RATIO} (ref 1–2)
ALP LIVER SERPL-CCNC: 81 U/L (ref 45–117)
ALT SERPL-CCNC: 28 U/L (ref 16–61)
ANION GAP SERPL CALC-SCNC: 4 MMOL/L (ref 0–18)
AST SERPL-CCNC: 22 U/L (ref 15–37)
BASOPHILS # BLD AUTO: 0.03 X10(3) UL (ref 0–0.2)
BASOPHILS NFR BLD AUTO: 0.5 %
BILIRUB SERPL-MCNC: 0.5 MG/DL (ref 0.1–2)
BILIRUB UR QL STRIP.AUTO: NEGATIVE
BUN BLD-MCNC: 20 MG/DL (ref 7–18)
BUN/CREAT SERPL: 17.1 (ref 10–20)
CALCIUM BLD-MCNC: 8.5 MG/DL (ref 8.5–10.1)
CHLORIDE SERPL-SCNC: 108 MMOL/L (ref 98–112)
CHOLEST SMN-MCNC: 168 MG/DL (ref ?–200)
CLARITY UR REFRACT.AUTO: CLEAR
CO2 SERPL-SCNC: 27 MMOL/L (ref 21–32)
COLOR UR AUTO: YELLOW
CREAT BLD-MCNC: 1.17 MG/DL (ref 0.7–1.3)
DEPRECATED RDW RBC AUTO: 43.8 FL (ref 35.1–46.3)
EOSINOPHIL # BLD AUTO: 0.07 X10(3) UL (ref 0–0.7)
EOSINOPHIL NFR BLD AUTO: 1.1 %
ERYTHROCYTE [DISTWIDTH] IN BLOOD BY AUTOMATED COUNT: 13.1 % (ref 11–15)
EST. AVERAGE GLUCOSE BLD GHB EST-MCNC: 123 MG/DL (ref 68–126)
GLOBULIN PLAS-MCNC: 4 G/DL (ref 2.8–4.4)
GLUCOSE BLD-MCNC: 94 MG/DL (ref 70–99)
GLUCOSE UR STRIP.AUTO-MCNC: NEGATIVE MG/DL
HBA1C MFR BLD HPLC: 5.9 % (ref ?–5.7)
HCT VFR BLD AUTO: 53.6 % (ref 39–53)
HDLC SERPL-MCNC: 34 MG/DL (ref 40–59)
HGB BLD-MCNC: 17.4 G/DL (ref 13–17.5)
IMM GRANULOCYTES # BLD AUTO: 0.01 X10(3) UL (ref 0–1)
IMM GRANULOCYTES NFR BLD: 0.2 %
KETONES UR STRIP.AUTO-MCNC: NEGATIVE MG/DL
LDLC SERPL CALC-MCNC: 115 MG/DL (ref ?–100)
LEUKOCYTE ESTERASE UR QL STRIP.AUTO: NEGATIVE
LYMPHOCYTES # BLD AUTO: 2.01 X10(3) UL (ref 1–4)
LYMPHOCYTES NFR BLD AUTO: 30.5 %
M PROTEIN MFR SERPL ELPH: 7.6 G/DL (ref 6.4–8.2)
MCH RBC QN AUTO: 29.7 PG (ref 26–34)
MCHC RBC AUTO-ENTMCNC: 32.5 G/DL (ref 31–37)
MCV RBC AUTO: 91.6 FL (ref 80–100)
MONOCYTES # BLD AUTO: 0.58 X10(3) UL (ref 0.1–1)
MONOCYTES NFR BLD AUTO: 8.8 %
NEUTROPHILS # BLD AUTO: 3.88 X10 (3) UL (ref 1.5–7.7)
NEUTROPHILS # BLD AUTO: 3.88 X10(3) UL (ref 1.5–7.7)
NEUTROPHILS NFR BLD AUTO: 58.9 %
NITRITE UR QL STRIP.AUTO: NEGATIVE
NONHDLC SERPL-MCNC: 134 MG/DL (ref ?–130)
OSMOLALITY SERPL CALC.SUM OF ELEC: 290 MOSM/KG (ref 275–295)
PATIENT FASTING Y/N/NP: YES
PATIENT FASTING Y/N/NP: YES
PH UR STRIP.AUTO: 5 [PH] (ref 4.5–8)
PLATELET # BLD AUTO: 220 10(3)UL (ref 150–450)
POTASSIUM SERPL-SCNC: 4 MMOL/L (ref 3.5–5.1)
PROT UR STRIP.AUTO-MCNC: NEGATIVE MG/DL
PSA SERPL-MCNC: 4.1 NG/ML (ref ?–4)
RBC # BLD AUTO: 5.85 X10(6)UL (ref 3.8–5.8)
SODIUM SERPL-SCNC: 139 MMOL/L (ref 136–145)
SP GR UR STRIP.AUTO: 1.02 (ref 1–1.03)
TRIGL SERPL-MCNC: 96 MG/DL (ref 30–149)
TSI SER-ACNC: 1.75 MIU/ML (ref 0.36–3.74)
UROBILINOGEN UR STRIP.AUTO-MCNC: <2 MG/DL
VLDLC SERPL CALC-MCNC: 19 MG/DL (ref 0–30)
WBC # BLD AUTO: 6.6 X10(3) UL (ref 4–11)

## 2019-12-31 PROCEDURE — 84443 ASSAY THYROID STIM HORMONE: CPT

## 2019-12-31 PROCEDURE — 83036 HEMOGLOBIN GLYCOSYLATED A1C: CPT

## 2019-12-31 PROCEDURE — 80061 LIPID PANEL: CPT

## 2019-12-31 PROCEDURE — 85025 COMPLETE CBC W/AUTO DIFF WBC: CPT

## 2019-12-31 PROCEDURE — 80053 COMPREHEN METABOLIC PANEL: CPT

## 2019-12-31 PROCEDURE — 84153 ASSAY OF PSA TOTAL: CPT

## 2019-12-31 PROCEDURE — 36415 COLL VENOUS BLD VENIPUNCTURE: CPT

## 2019-12-31 PROCEDURE — 81001 URINALYSIS AUTO W/SCOPE: CPT

## 2020-01-02 DIAGNOSIS — R97.20 ELEVATED PSA: Primary | ICD-10-CM

## 2020-02-09 DIAGNOSIS — I10 ESSENTIAL HYPERTENSION: ICD-10-CM

## 2020-02-10 RX ORDER — LISINOPRIL 10 MG/1
TABLET ORAL
Qty: 30 TABLET | Refills: 4 | Status: SHIPPED | OUTPATIENT
Start: 2020-02-10 | End: 2020-06-26

## 2020-06-25 NOTE — PROGRESS NOTES
Nancy Armendariz is a 70year old male. cc hypertension,  elevated hemoglobin level, cough  HPI:   Patient is coming for follow-up of hypertension on lisinopril 10 mg daily patient is doing well with medication.   Will check his blood work to look for his elec tenderness  EXTREMITIES: no edema  Psychiatric - alert  and oriented x3, normal mood     ASSESSMENT AND PLAN:     Essential hypertension  (primary encounter diagnosis)  Elevated hemoglobin (hcc)  Cough    Orders Placed This Encounter      COMP METABOLIC PA friend

## 2020-06-26 ENCOUNTER — OFFICE VISIT (OUTPATIENT)
Dept: FAMILY MEDICINE CLINIC | Facility: CLINIC | Age: 65
End: 2020-06-26
Payer: MEDICARE

## 2020-06-26 ENCOUNTER — APPOINTMENT (OUTPATIENT)
Dept: LAB | Age: 65
End: 2020-06-26
Attending: FAMILY MEDICINE
Payer: MEDICARE

## 2020-06-26 VITALS
WEIGHT: 204 LBS | RESPIRATION RATE: 16 BRPM | DIASTOLIC BLOOD PRESSURE: 66 MMHG | SYSTOLIC BLOOD PRESSURE: 118 MMHG | TEMPERATURE: 98 F | HEART RATE: 56 BPM | HEIGHT: 72 IN | BODY MASS INDEX: 27.63 KG/M2 | OXYGEN SATURATION: 98 %

## 2020-06-26 DIAGNOSIS — R97.20 ELEVATED PSA MEASUREMENT: ICD-10-CM

## 2020-06-26 DIAGNOSIS — R97.20 ELEVATED PSA: ICD-10-CM

## 2020-06-26 DIAGNOSIS — R73.9 HYPERGLYCEMIA: ICD-10-CM

## 2020-06-26 DIAGNOSIS — L81.9 DISCOLORATION OF SKIN OF TOE: ICD-10-CM

## 2020-06-26 DIAGNOSIS — I10 ESSENTIAL HYPERTENSION: ICD-10-CM

## 2020-06-26 DIAGNOSIS — I10 ESSENTIAL HYPERTENSION: Primary | ICD-10-CM

## 2020-06-26 PROCEDURE — 84153 ASSAY OF PSA TOTAL: CPT

## 2020-06-26 PROCEDURE — 80053 COMPREHEN METABOLIC PANEL: CPT

## 2020-06-26 PROCEDURE — 36415 COLL VENOUS BLD VENIPUNCTURE: CPT

## 2020-06-26 PROCEDURE — 99214 OFFICE O/P EST MOD 30 MIN: CPT | Performed by: FAMILY MEDICINE

## 2020-06-26 RX ORDER — LISINOPRIL 10 MG/1
10 TABLET ORAL DAILY
Qty: 90 TABLET | Refills: 1 | Status: SHIPPED | OUTPATIENT
Start: 2020-06-26 | End: 2021-01-28

## 2020-06-26 NOTE — PATIENT INSTRUCTIONS
Continue lisinopril 10 mg 1 tablet daily. Healthy diet. Stay active. Keep good hydration. Further recommendation pending test results.

## 2020-06-26 NOTE — PROGRESS NOTES
Joni Adorno is a 75year old male. cc hypertension, HDL, elevated PSA  HPI:   Patient is coming for follow-up of hypertension on lisinopril 10 mg daily patient is doing well with medication. Will check his blood work to look for his electrolytes.   Yannick Pinon developed, well nourished,in no apparent distress  SKIN: no rashes,no suspicious lesions  HEENT: atraumatic, normocephalic,ears and throat are clear  NECK: supple,no adenopathy,  LUNGS: clear to auscultation  CARDIO: RRR without murmur  GI: good BS's,no ma

## 2020-06-29 DIAGNOSIS — R73.9 HYPERGLYCEMIA: ICD-10-CM

## 2020-06-29 DIAGNOSIS — I10 ESSENTIAL HYPERTENSION: Primary | ICD-10-CM

## 2020-06-29 DIAGNOSIS — E78.5 DYSLIPIDEMIA: ICD-10-CM

## 2020-06-29 DIAGNOSIS — R97.20 ELEVATED PSA MEASUREMENT: ICD-10-CM

## 2020-06-29 DIAGNOSIS — D58.2 ELEVATED HEMOGLOBIN (HCC): ICD-10-CM

## 2020-08-13 DIAGNOSIS — I10 ESSENTIAL HYPERTENSION: ICD-10-CM

## 2020-08-14 RX ORDER — LISINOPRIL 10 MG/1
TABLET ORAL
Qty: 30 TABLET | Refills: 0 | OUTPATIENT
Start: 2020-08-14

## 2020-08-14 NOTE — TELEPHONE ENCOUNTER
Call to [employer]. Spoke to Jarvis. Muta states pt has another refill on file and it will be processed now.

## 2021-01-28 ENCOUNTER — LABORATORY ENCOUNTER (OUTPATIENT)
Dept: LAB | Age: 66
End: 2021-01-28
Attending: FAMILY MEDICINE
Payer: MEDICARE

## 2021-01-28 ENCOUNTER — OFFICE VISIT (OUTPATIENT)
Dept: FAMILY MEDICINE CLINIC | Facility: CLINIC | Age: 66
End: 2021-01-28
Payer: MEDICARE

## 2021-01-28 VITALS
HEART RATE: 64 BPM | OXYGEN SATURATION: 97 % | SYSTOLIC BLOOD PRESSURE: 112 MMHG | HEIGHT: 72 IN | RESPIRATION RATE: 16 BRPM | DIASTOLIC BLOOD PRESSURE: 72 MMHG | BODY MASS INDEX: 27.09 KG/M2 | TEMPERATURE: 97 F | WEIGHT: 200 LBS

## 2021-01-28 DIAGNOSIS — R97.20 ELEVATED PSA MEASUREMENT: ICD-10-CM

## 2021-01-28 DIAGNOSIS — R73.9 HYPERGLYCEMIA: ICD-10-CM

## 2021-01-28 DIAGNOSIS — D58.2 ELEVATED HEMOGLOBIN (HCC): ICD-10-CM

## 2021-01-28 DIAGNOSIS — I10 ESSENTIAL HYPERTENSION: ICD-10-CM

## 2021-01-28 DIAGNOSIS — Z00.00 ENCOUNTER FOR ANNUAL HEALTH EXAMINATION: Primary | ICD-10-CM

## 2021-01-28 DIAGNOSIS — E78.5 DYSLIPIDEMIA: ICD-10-CM

## 2021-01-28 DIAGNOSIS — R97.20 ELEVATED PSA: ICD-10-CM

## 2021-01-28 LAB
ALBUMIN SERPL-MCNC: 3.9 G/DL (ref 3.4–5)
ALBUMIN/GLOB SERPL: 1 {RATIO} (ref 1–2)
ALP LIVER SERPL-CCNC: 72 U/L
ALT SERPL-CCNC: 33 U/L
ANION GAP SERPL CALC-SCNC: 2 MMOL/L (ref 0–18)
AST SERPL-CCNC: 23 U/L (ref 15–37)
BASOPHILS # BLD AUTO: 0.02 X10(3) UL (ref 0–0.2)
BASOPHILS NFR BLD AUTO: 0.4 %
BILIRUB SERPL-MCNC: 0.8 MG/DL (ref 0.1–2)
BUN BLD-MCNC: 20 MG/DL (ref 7–18)
BUN/CREAT SERPL: 17.4 (ref 10–20)
CALCIUM BLD-MCNC: 9.4 MG/DL (ref 8.5–10.1)
CHLORIDE SERPL-SCNC: 107 MMOL/L (ref 98–112)
CHOLEST SMN-MCNC: 170 MG/DL (ref ?–200)
CO2 SERPL-SCNC: 29 MMOL/L (ref 21–32)
CREAT BLD-MCNC: 1.15 MG/DL
DEPRECATED RDW RBC AUTO: 44.5 FL (ref 35.1–46.3)
EOSINOPHIL # BLD AUTO: 0.09 X10(3) UL (ref 0–0.7)
EOSINOPHIL NFR BLD AUTO: 1.8 %
ERYTHROCYTE [DISTWIDTH] IN BLOOD BY AUTOMATED COUNT: 13.2 % (ref 11–15)
EST. AVERAGE GLUCOSE BLD GHB EST-MCNC: 111 MG/DL (ref 68–126)
GLOBULIN PLAS-MCNC: 3.9 G/DL (ref 2.8–4.4)
GLUCOSE BLD-MCNC: 90 MG/DL (ref 70–99)
HBA1C MFR BLD HPLC: 5.5 % (ref ?–5.7)
HCT VFR BLD AUTO: 56.6 %
HDLC SERPL-MCNC: 37 MG/DL (ref 40–59)
HGB BLD-MCNC: 18.5 G/DL
IMM GRANULOCYTES # BLD AUTO: 0.01 X10(3) UL (ref 0–1)
IMM GRANULOCYTES NFR BLD: 0.2 %
LDLC SERPL CALC-MCNC: 112 MG/DL (ref ?–100)
LYMPHOCYTES # BLD AUTO: 1.48 X10(3) UL (ref 1–4)
LYMPHOCYTES NFR BLD AUTO: 29.1 %
M PROTEIN MFR SERPL ELPH: 7.8 G/DL (ref 6.4–8.2)
MCH RBC QN AUTO: 30.7 PG (ref 26–34)
MCHC RBC AUTO-ENTMCNC: 32.7 G/DL (ref 31–37)
MCV RBC AUTO: 94 FL
MONOCYTES # BLD AUTO: 0.45 X10(3) UL (ref 0.1–1)
MONOCYTES NFR BLD AUTO: 8.9 %
NEUTROPHILS # BLD AUTO: 3.03 X10 (3) UL (ref 1.5–7.7)
NEUTROPHILS # BLD AUTO: 3.03 X10(3) UL (ref 1.5–7.7)
NEUTROPHILS NFR BLD AUTO: 59.6 %
NONHDLC SERPL-MCNC: 133 MG/DL (ref ?–130)
OSMOLALITY SERPL CALC.SUM OF ELEC: 288 MOSM/KG (ref 275–295)
PATIENT FASTING Y/N/NP: YES
PATIENT FASTING Y/N/NP: YES
PLATELET # BLD AUTO: 207 10(3)UL (ref 150–450)
POTASSIUM SERPL-SCNC: 5 MMOL/L (ref 3.5–5.1)
PSA SERPL-MCNC: 5.56 NG/ML (ref ?–4)
RBC # BLD AUTO: 6.02 X10(6)UL
SODIUM SERPL-SCNC: 138 MMOL/L (ref 136–145)
TRIGL SERPL-MCNC: 103 MG/DL (ref 30–149)
TSI SER-ACNC: 1.36 MIU/ML (ref 0.36–3.74)
VLDLC SERPL CALC-MCNC: 21 MG/DL (ref 0–30)
WBC # BLD AUTO: 5.1 X10(3) UL (ref 4–11)

## 2021-01-28 PROCEDURE — 83036 HEMOGLOBIN GLYCOSYLATED A1C: CPT

## 2021-01-28 PROCEDURE — G0444 DEPRESSION SCREEN ANNUAL: HCPCS | Performed by: FAMILY MEDICINE

## 2021-01-28 PROCEDURE — 84443 ASSAY THYROID STIM HORMONE: CPT

## 2021-01-28 PROCEDURE — 80061 LIPID PANEL: CPT

## 2021-01-28 PROCEDURE — 84153 ASSAY OF PSA TOTAL: CPT

## 2021-01-28 PROCEDURE — G0439 PPPS, SUBSEQ VISIT: HCPCS | Performed by: FAMILY MEDICINE

## 2021-01-28 PROCEDURE — 85025 COMPLETE CBC W/AUTO DIFF WBC: CPT

## 2021-01-28 PROCEDURE — 80053 COMPREHEN METABOLIC PANEL: CPT

## 2021-01-28 PROCEDURE — 99213 OFFICE O/P EST LOW 20 MIN: CPT | Performed by: FAMILY MEDICINE

## 2021-01-28 PROCEDURE — 36415 COLL VENOUS BLD VENIPUNCTURE: CPT

## 2021-01-28 RX ORDER — LISINOPRIL 10 MG/1
10 TABLET ORAL DAILY
Qty: 90 TABLET | Refills: 1 | Status: SHIPPED | OUTPATIENT
Start: 2021-01-28 | End: 2021-04-23

## 2021-01-28 NOTE — PROGRESS NOTES
HPI:   Joni Adorno is a 77year old male who presents for a Medicare Initial Preventative Physical Exam (Welcome to Medicare- < 12 months on Medicare), hypertension, prostate enlargement, hyperglycemia.     Hypertension blood pressure stable on lisinop skin of lower extremity     Basal cell carcinoma (BCC) of right lower leg     Personal history of malignant neoplasm of skin     Discoloration of skin of toe     Elevated PSA measurement    Wt Readings from Last 3 Encounters:  01/28/21 : 200 lb (90.7 kg) congestion, sinus pain or ST  LUNGS: denies shortness of breath with exertion  CARDIOVASCULAR: denies chest pain on exertion  GI: denies abdominal pain, denies heartburn  : 1 per night nocturia, no complaint of urinary incontinence  MUSCULOSKELETAL: uzma cyanosis or edema   Pulses: 2+ and symmetric   Skin: Skin color, texture, turgor normal, no rashes or lesions   Lymph nodes: Cervical, supraclavicular, and axillary nodes normal   Neurologic: Normal            Vaccination History     Immunization History fats and carbs. Stay active. Do fasting blood work today. Further recommendation pending test results. The patient indicates understanding of these issues and agrees to the plan. Reinforced healthy diet, lifestyle, and exercise.   Prescriptio Cancer Screening      PSA  Annually PSA due on 06/26/2022  Update Health Maintenance if applicable     Immunizations (Update Immunization Activity if applicable)     Influenza  Covered Annually 10/13/2020   Please get every year    Pneumococcal 13 (Prevnar

## 2021-01-28 NOTE — PATIENT INSTRUCTIONS
Continue current meds. Watch diet for fats and carbs. Stay active. Do fasting blood work today. Further recommendation pending test results.     Roldan Solomon's SCREENING SCHEDULE   Tests on this list are recommended by your physician but may not years old and have smoked more than 100 cigarettes in their lifetime   • Anyone with a family history    Colorectal Cancer Screening Covered up to Age 76     Colonoscopy Screen   Covered every 10 years- more often if abnormal Colonoscopy due on 09/04/2028 risk factors:   End-stage renal disease   Hemophiliacs who received Factor VIII or IX concentrates   Clients of institutions for the mentally retarded   Persons who live in the same house as a HepB virus carrier   Homosexual men   Illicit injectable drug a

## 2021-03-04 DIAGNOSIS — Z23 NEED FOR VACCINATION: ICD-10-CM

## 2021-04-23 DIAGNOSIS — I10 ESSENTIAL HYPERTENSION: ICD-10-CM

## 2021-04-23 RX ORDER — LISINOPRIL 10 MG/1
TABLET ORAL
Qty: 90 TABLET | Refills: 1 | Status: SHIPPED | OUTPATIENT
Start: 2021-04-23 | End: 2022-01-28

## 2021-05-21 ENCOUNTER — MED REC SCAN ONLY (OUTPATIENT)
Dept: FAMILY MEDICINE CLINIC | Facility: CLINIC | Age: 66
End: 2021-05-21

## 2021-06-23 ENCOUNTER — OFFICE VISIT (OUTPATIENT)
Dept: FAMILY MEDICINE CLINIC | Facility: CLINIC | Age: 66
End: 2021-06-23
Payer: MEDICARE

## 2021-06-23 VITALS
HEIGHT: 72 IN | WEIGHT: 202.81 LBS | BODY MASS INDEX: 27.47 KG/M2 | HEART RATE: 60 BPM | DIASTOLIC BLOOD PRESSURE: 84 MMHG | RESPIRATION RATE: 18 BRPM | SYSTOLIC BLOOD PRESSURE: 118 MMHG

## 2021-06-23 DIAGNOSIS — Z01.818 PRE-OP EXAMINATION: Primary | ICD-10-CM

## 2021-06-23 DIAGNOSIS — I10 ESSENTIAL HYPERTENSION: ICD-10-CM

## 2021-06-23 DIAGNOSIS — R97.20 ELEVATED PSA: ICD-10-CM

## 2021-06-23 DIAGNOSIS — E78.5 DYSLIPIDEMIA: ICD-10-CM

## 2021-06-23 DIAGNOSIS — D58.2 ELEVATED HEMOGLOBIN (HCC): ICD-10-CM

## 2021-06-23 DIAGNOSIS — R73.9 HYPERGLYCEMIA: ICD-10-CM

## 2021-06-23 DIAGNOSIS — H25.013 CORTICAL AGE-RELATED CATARACT OF BOTH EYES: ICD-10-CM

## 2021-06-23 PROCEDURE — 99214 OFFICE O/P EST MOD 30 MIN: CPT | Performed by: FAMILY MEDICINE

## 2021-06-23 PROCEDURE — 93000 ELECTROCARDIOGRAM COMPLETE: CPT | Performed by: FAMILY MEDICINE

## 2021-06-23 NOTE — PROGRESS NOTES
Tyesha Beck is a 77year old male who presents for a pre-operative physical exam. Patient is to have bilateral cataract surgery right eye on June 30, 2021, left eye on July 14, 2021 by Dr. Anaya Rosario at [facility] for surgery in [facility] under [address] overweight   • Cancer Sister         breast ca in 42's    • Breast Cancer Other         Siblings      Social History:   Social History    Tobacco Use      Smoking status: Never Smoker      Smokeless tobacco: Never Used    Vaping Use      Vaping Use: diagnosis)  Essential hypertension  Hyperglycemia  Dyslipidemia  Cortical age-related cataract of both eyes  Elevated hemoglobin (hcc)  Elevated psa    Patient is in a satisfactory condition and acceptable risk for planned surgery and anesthesia.     No ord

## 2021-06-24 ENCOUNTER — TELEPHONE (OUTPATIENT)
Dept: FAMILY MEDICINE CLINIC | Facility: CLINIC | Age: 66
End: 2021-06-24

## 2021-06-24 NOTE — TELEPHONE ENCOUNTER
Per Dr. Myers, pre-op notes/clearance and EKG faxed to Dr. Antonio Carvalho at [phone]. Confirmation received. EKG sent to scanning. No further action needed.

## 2022-01-28 ENCOUNTER — OFFICE VISIT (OUTPATIENT)
Dept: FAMILY MEDICINE CLINIC | Facility: CLINIC | Age: 67
End: 2022-01-28
Payer: MEDICARE

## 2022-01-28 VITALS
RESPIRATION RATE: 16 BRPM | BODY MASS INDEX: 28.04 KG/M2 | DIASTOLIC BLOOD PRESSURE: 56 MMHG | SYSTOLIC BLOOD PRESSURE: 116 MMHG | WEIGHT: 207 LBS | HEART RATE: 59 BPM | OXYGEN SATURATION: 98 % | TEMPERATURE: 98 F | HEIGHT: 72 IN

## 2022-01-28 DIAGNOSIS — R73.9 HYPERGLYCEMIA: ICD-10-CM

## 2022-01-28 DIAGNOSIS — E78.5 DYSLIPIDEMIA: ICD-10-CM

## 2022-01-28 DIAGNOSIS — Z00.00 ENCOUNTER FOR ANNUAL HEALTH EXAMINATION: Primary | ICD-10-CM

## 2022-01-28 DIAGNOSIS — D58.2 ELEVATED HEMOGLOBIN (HCC): ICD-10-CM

## 2022-01-28 DIAGNOSIS — R97.20 ELEVATED PSA: ICD-10-CM

## 2022-01-28 DIAGNOSIS — I10 ESSENTIAL HYPERTENSION: ICD-10-CM

## 2022-01-28 PROCEDURE — G0444 DEPRESSION SCREEN ANNUAL: HCPCS | Performed by: FAMILY MEDICINE

## 2022-01-28 PROCEDURE — 99214 OFFICE O/P EST MOD 30 MIN: CPT | Performed by: FAMILY MEDICINE

## 2022-01-28 PROCEDURE — G0439 PPPS, SUBSEQ VISIT: HCPCS | Performed by: FAMILY MEDICINE

## 2022-01-28 RX ORDER — LISINOPRIL 10 MG/1
10 TABLET ORAL DAILY
Qty: 90 TABLET | Refills: 1 | Status: SHIPPED | OUTPATIENT
Start: 2022-01-28

## 2022-01-28 NOTE — PROGRESS NOTES
Subjective:   Elli Link is a 74year old male who presents for a Medicare Subsequent Annual Wellness visit (Pt already had Initial Annual Wellness). Hypertension blood pressure stable on lisinopril 10 mg daily. He is doing well with medication. (BCC) of right lower leg     Personal history of malignant neoplasm of skin     Discoloration of skin of toe     Elevated PSA measurement    Allergies:  He is allergic to naprosyn [naproxen].     Current Medications:  lisinopril 10 MG Oral Tab, Take 1 table asthma    Objective:   Physical Exam  General Appearance:  Alert, cooperative, no distress, appears stated age   Head:  Normocephalic, without obvious abnormality, atraumatic   Eyes:  PERRL, conjunctiva/corneas clear, EOM's intact, both eyes   Ears:  Devendra Sand examination (Primary)  2. Essential hypertension  -     Urinalysis with Culture Reflex; Future; Expected date: 01/28/2022  -     Lisinopril; Take 1 tablet (10 mg total) by mouth daily. Dispense: 90 tablet; Refill: 1  3.  Hyperglycemia  -     Hemoglobin A1C well-being?: (P) Social Interaction;Puzzles; Visiting Friends; Visiting Family  At any time do you feel concerned for the safety/well-being of yourself and/or your children, in your home or elsewhere?: No  Have you had any immunizations at another office suc Fecal Occult Blood Test Covered annually -   Prostate Cancer Screening    Prostate-Specific Antigen (PSA) Annually Lab Results   Component Value Date    PSA 5.56 (H) 01/28/2021     PSA due on 01/28/2023   Immunizations    Influenza Covered once per flu sea

## 2022-01-28 NOTE — PATIENT INSTRUCTIONS
Continue current meds. Watch diet for fats and carbs. Stay active.    Call [phone] to schedule fasting blood work,     [facility] SCREENING SCHEDULE   Tests on this list are recommended by your physician but may not be covered, or covered at Immunizations    Influenza Covered once per flu season  Please get every year 10/01/2021  No recommendations at this time    Pneumococcal Each vaccine ([id number] & [id number]) covered once after 65 Prevnar 13: 12/19/2018    [id number]: 12/20/2019     N

## 2022-02-15 ENCOUNTER — LAB ENCOUNTER (OUTPATIENT)
Dept: LAB | Age: 67
End: 2022-02-15
Attending: FAMILY MEDICINE
Payer: MEDICARE

## 2022-02-15 DIAGNOSIS — E78.5 DYSLIPIDEMIA: ICD-10-CM

## 2022-02-15 DIAGNOSIS — R73.9 HYPERGLYCEMIA: ICD-10-CM

## 2022-02-15 DIAGNOSIS — I10 ESSENTIAL HYPERTENSION: ICD-10-CM

## 2022-02-15 DIAGNOSIS — D58.2 ELEVATED HEMOGLOBIN (HCC): ICD-10-CM

## 2022-02-15 DIAGNOSIS — R97.20 ELEVATED PSA: ICD-10-CM

## 2022-02-15 DIAGNOSIS — L56.8 PHOTOSENSITIVITY: ICD-10-CM

## 2022-02-15 LAB
ALBUMIN SERPL-MCNC: 3.8 G/DL (ref 3.4–5)
ALBUMIN/GLOB SERPL: 1.1 {RATIO} (ref 1–2)
ALP LIVER SERPL-CCNC: 74 U/L
ALT SERPL-CCNC: 29 U/L
ANION GAP SERPL CALC-SCNC: 4 MMOL/L (ref 0–18)
AST SERPL-CCNC: 22 U/L (ref 15–37)
BASOPHILS # BLD AUTO: 0.03 X10(3) UL (ref 0–0.2)
BASOPHILS NFR BLD AUTO: 0.5 %
BILIRUB SERPL-MCNC: 0.8 MG/DL (ref 0.1–2)
BILIRUB UR QL STRIP.AUTO: NEGATIVE
CALCIUM BLD-MCNC: 9.1 MG/DL (ref 8.5–10.1)
CHLORIDE SERPL-SCNC: 108 MMOL/L (ref 98–112)
CHOLEST SERPL-MCNC: 153 MG/DL (ref ?–200)
CLARITY UR REFRACT.AUTO: CLEAR
CO2 SERPL-SCNC: 28 MMOL/L (ref 21–32)
COLOR UR AUTO: YELLOW
EOSINOPHIL # BLD AUTO: 0.09 X10(3) UL (ref 0–0.7)
EOSINOPHIL NFR BLD AUTO: 1.5 %
ERYTHROCYTE [DISTWIDTH] IN BLOOD BY AUTOMATED COUNT: 13 %
EST. AVERAGE GLUCOSE BLD GHB EST-MCNC: 114 MG/DL (ref 68–126)
FASTING PATIENT LIPID ANSWER: YES
FASTING STATUS PATIENT QL REPORTED: YES
GLOBULIN PLAS-MCNC: 3.5 G/DL (ref 2.8–4.4)
GLUCOSE BLD-MCNC: 88 MG/DL (ref 70–99)
GLUCOSE UR STRIP.AUTO-MCNC: NEGATIVE MG/DL
HBA1C MFR BLD: 5.6 % (ref ?–5.7)
HCT VFR BLD AUTO: 52.4 %
HDLC SERPL-MCNC: 37 MG/DL (ref 40–59)
HGB BLD-MCNC: 17 G/DL
IMM GRANULOCYTES # BLD AUTO: 0.01 X10(3) UL (ref 0–1)
IMM GRANULOCYTES NFR BLD: 0.2 %
KETONES UR STRIP.AUTO-MCNC: NEGATIVE MG/DL
LDLC SERPL CALC-MCNC: 95 MG/DL (ref ?–100)
LEUKOCYTE ESTERASE UR QL STRIP.AUTO: NEGATIVE
LYMPHOCYTES # BLD AUTO: 1.77 X10(3) UL (ref 1–4)
LYMPHOCYTES NFR BLD AUTO: 30.2 %
MCH RBC QN AUTO: 29.4 PG (ref 26–34)
MCHC RBC AUTO-ENTMCNC: 32.4 G/DL (ref 31–37)
MCV RBC AUTO: 90.7 FL
MONOCYTES # BLD AUTO: 0.63 X10(3) UL (ref 0.1–1)
MONOCYTES NFR BLD AUTO: 10.8 %
NEUTROPHILS # BLD AUTO: 3.33 X10 (3) UL (ref 1.5–7.7)
NEUTROPHILS # BLD AUTO: 3.33 X10(3) UL (ref 1.5–7.7)
NEUTROPHILS NFR BLD AUTO: 56.8 %
NITRITE UR QL STRIP.AUTO: NEGATIVE
NONHDLC SERPL-MCNC: 116 MG/DL (ref ?–130)
OSMOLALITY SERPL CALC.SUM OF ELEC: 291 MOSM/KG (ref 275–295)
PH UR STRIP.AUTO: 5 [PH] (ref 5–8)
PLATELET # BLD AUTO: 201 10(3)UL (ref 150–450)
POTASSIUM SERPL-SCNC: 4.8 MMOL/L (ref 3.5–5.1)
PROT SERPL-MCNC: 7.3 G/DL (ref 6.4–8.2)
PROT UR STRIP.AUTO-MCNC: NEGATIVE MG/DL
PSA SERPL-MCNC: 11.6 NG/ML (ref ?–4)
RBC # BLD AUTO: 5.78 X10(6)UL
RBC UR QL AUTO: NEGATIVE
SODIUM SERPL-SCNC: 140 MMOL/L (ref 136–145)
SP GR UR STRIP.AUTO: 1.02 (ref 1–1.03)
TRIGL SERPL-MCNC: 114 MG/DL (ref 30–149)
TSI SER-ACNC: 1.63 MIU/ML (ref 0.36–3.74)
UROBILINOGEN UR STRIP.AUTO-MCNC: <2 MG/DL
VLDLC SERPL CALC-MCNC: 19 MG/DL (ref 0–30)
WBC # BLD AUTO: 5.9 X10(3) UL (ref 4–11)

## 2022-02-15 PROCEDURE — 83036 HEMOGLOBIN GLYCOSYLATED A1C: CPT

## 2022-02-15 PROCEDURE — 80053 COMPREHEN METABOLIC PANEL: CPT

## 2022-02-15 PROCEDURE — 80061 LIPID PANEL: CPT

## 2022-02-15 PROCEDURE — 84153 ASSAY OF PSA TOTAL: CPT

## 2022-02-15 PROCEDURE — 81003 URINALYSIS AUTO W/O SCOPE: CPT

## 2022-02-15 PROCEDURE — 86038 ANTINUCLEAR ANTIBODIES: CPT

## 2022-02-15 PROCEDURE — 85025 COMPLETE CBC W/AUTO DIFF WBC: CPT

## 2022-02-15 PROCEDURE — 84443 ASSAY THYROID STIM HORMONE: CPT

## 2022-02-16 LAB — ANA SER QL: NEGATIVE

## 2022-05-13 DIAGNOSIS — I10 ESSENTIAL HYPERTENSION: ICD-10-CM

## 2022-05-13 RX ORDER — LISINOPRIL 10 MG/1
10 TABLET ORAL DAILY
Qty: 90 TABLET | Refills: 0 | Status: SHIPPED | OUTPATIENT
Start: 2022-05-13 | End: 2023-01-06

## 2022-05-29 ENCOUNTER — HOSPITAL ENCOUNTER (OUTPATIENT)
Age: 67
Discharge: HOME | End: 2022-05-29
Payer: MEDICARE

## 2022-05-29 VITALS
TEMPERATURE: 97 F | BODY MASS INDEX: 27.09 KG/M2 | RESPIRATION RATE: 18 BRPM | OXYGEN SATURATION: 100 % | HEART RATE: 54 BPM | SYSTOLIC BLOOD PRESSURE: 138 MMHG | WEIGHT: 200 LBS | DIASTOLIC BLOOD PRESSURE: 69 MMHG | HEIGHT: 72 IN

## 2022-05-29 DIAGNOSIS — H61.22 EXCESSIVE CERUMEN IN EAR CANAL, LEFT: ICD-10-CM

## 2022-05-29 DIAGNOSIS — H92.02 ACUTE OTALGIA, LEFT: Primary | ICD-10-CM

## 2022-05-29 PROCEDURE — 69209 REMOVE IMPACTED EAR WAX UNI: CPT

## 2022-05-29 PROCEDURE — 99213 OFFICE O/P EST LOW 20 MIN: CPT

## 2022-05-29 PROCEDURE — 99203 OFFICE O/P NEW LOW 30 MIN: CPT

## 2022-05-29 RX ORDER — OFLOXACIN 3 MG/ML
10 SOLUTION AURICULAR (OTIC) DAILY
Qty: 5 ML | Refills: 0 | Status: SHIPPED | OUTPATIENT
Start: 2022-05-29 | End: 2022-06-05

## 2022-08-16 ENCOUNTER — TELEPHONE (OUTPATIENT)
Dept: FAMILY MEDICINE CLINIC | Facility: CLINIC | Age: 67
End: 2022-08-16

## 2022-08-16 NOTE — TELEPHONE ENCOUNTER
S/w pt. He tested neg but wife is +  Has barky cough. No other sx's  Discussed supportive care and to monitor for worsening sx's. He voiced understanding.

## 2022-08-16 NOTE — TELEPHONE ENCOUNTER
Pt wife is Covid + as of this AM 8/16 via at home test.    Pt has tested Covid - this AM, however experiencing symptoms. Symptoms: Sore throat, productive cough on/off. Onset: unknown. (symptoms for a few days)    Pt wife has questions regarding pt's symptoms as he is Covid - while wife is Covid +. See also TE for pt's wife, Jacek Andrade.

## 2023-01-06 DIAGNOSIS — I10 ESSENTIAL HYPERTENSION: ICD-10-CM

## 2023-01-06 RX ORDER — LISINOPRIL 10 MG/1
10 TABLET ORAL DAILY
Qty: 90 TABLET | Refills: 0 | Status: SHIPPED | OUTPATIENT
Start: 2023-01-06

## 2023-01-27 ENCOUNTER — OFFICE VISIT (OUTPATIENT)
Dept: FAMILY MEDICINE CLINIC | Facility: CLINIC | Age: 68
End: 2023-01-27
Payer: MEDICARE

## 2023-01-27 ENCOUNTER — TELEPHONE (OUTPATIENT)
Dept: FAMILY MEDICINE CLINIC | Facility: CLINIC | Age: 68
End: 2023-01-27

## 2023-01-27 VITALS
DIASTOLIC BLOOD PRESSURE: 68 MMHG | RESPIRATION RATE: 16 BRPM | TEMPERATURE: 98 F | BODY MASS INDEX: 28.04 KG/M2 | SYSTOLIC BLOOD PRESSURE: 109 MMHG | OXYGEN SATURATION: 98 % | HEIGHT: 72 IN | WEIGHT: 207 LBS | HEART RATE: 68 BPM

## 2023-01-27 DIAGNOSIS — J98.8 VIRAL RESPIRATORY ILLNESS: Primary | ICD-10-CM

## 2023-01-27 DIAGNOSIS — R09.82 POST-NASAL DRAINAGE: ICD-10-CM

## 2023-01-27 DIAGNOSIS — B97.89 VIRAL RESPIRATORY ILLNESS: Primary | ICD-10-CM

## 2023-01-27 NOTE — TELEPHONE ENCOUNTER
Pt calling requesting apt or just RX for ongoing symptoms    Symptoms: sinus headache/ chest congestion, occasional cough. Ongoing 3 days. Negative Covid test.    Pt states this is typical for him around Jan/Feb every year and Dr MICHAEL usually sends in [place]. Please advise if / when apt can be scheduled? Or if RX can be send w/o apt?

## 2023-01-27 NOTE — TELEPHONE ENCOUNTER
Patient should be seen for the symptoms.   We can have him to come to our walk-in clinic for evaluation may need to have some additional testing possibly repeat COVID test.  Thank you

## 2023-02-22 ENCOUNTER — OFFICE VISIT (OUTPATIENT)
Dept: FAMILY MEDICINE CLINIC | Facility: CLINIC | Age: 68
End: 2023-02-22
Payer: MEDICARE

## 2023-02-22 VITALS
HEART RATE: 60 BPM | TEMPERATURE: 98 F | SYSTOLIC BLOOD PRESSURE: 126 MMHG | DIASTOLIC BLOOD PRESSURE: 72 MMHG | WEIGHT: 203.19 LBS | RESPIRATION RATE: 16 BRPM | BODY MASS INDEX: 27.52 KG/M2 | HEIGHT: 72 IN

## 2023-02-22 DIAGNOSIS — I10 ESSENTIAL HYPERTENSION: ICD-10-CM

## 2023-02-22 DIAGNOSIS — R97.20 ELEVATED PSA: ICD-10-CM

## 2023-02-22 DIAGNOSIS — R73.9 HYPERGLYCEMIA: ICD-10-CM

## 2023-02-22 DIAGNOSIS — F43.9 STRESS: ICD-10-CM

## 2023-02-22 DIAGNOSIS — Z00.00 ENCOUNTER FOR ANNUAL HEALTH EXAMINATION: Primary | ICD-10-CM

## 2023-02-22 DIAGNOSIS — D58.2 ELEVATED HEMOGLOBIN (HCC): ICD-10-CM

## 2023-02-22 DIAGNOSIS — E78.5 DYSLIPIDEMIA: ICD-10-CM

## 2023-02-22 PROCEDURE — 99214 OFFICE O/P EST MOD 30 MIN: CPT | Performed by: FAMILY MEDICINE

## 2023-02-22 PROCEDURE — G0444 DEPRESSION SCREEN ANNUAL: HCPCS | Performed by: FAMILY MEDICINE

## 2023-02-22 PROCEDURE — G0439 PPPS, SUBSEQ VISIT: HCPCS | Performed by: FAMILY MEDICINE

## 2023-02-22 PROCEDURE — 1126F AMNT PAIN NOTED NONE PRSNT: CPT | Performed by: FAMILY MEDICINE

## 2023-02-22 RX ORDER — LISINOPRIL 10 MG/1
10 TABLET ORAL DAILY
Qty: 90 TABLET | Refills: 1 | Status: SHIPPED | OUTPATIENT
Start: 2023-02-22

## 2023-02-22 NOTE — PATIENT INSTRUCTIONS
Shingrix shingles vaccination. Continue current meds. Watch diet for fats and carbs. Stay active. Kellie Solomon's SCREENING SCHEDULE   Tests on this list are recommended by your physician but may not be covered, or covered at this frequency, by your insurer. Please check with your insurance carrier before scheduling to verify coverage.    PREVENTATIVE SERVICES FREQUENCY &  COVERAGE DETAILS LAST COMPLETION DATE   Diabetes Screening    Fasting Blood Sugar / Glucose    One screening every 12 months if never tested or if previously tested but not diagnosed with pre-diabetes   One screening every 6 months if diagnosed with pre-diabetes Lab Results   Component Value Date    GLU 88 02/15/2022        Cardiovascular Disease Screening    Lipid Panel  Cholesterol  Lipoprotein (HDL)  Triglycerides Covered every 5 years for all Medicare beneficiaries without apparent signs or symptoms of cardiovascular disease Lab Results   Component Value Date    CHOLEST 153 02/15/2022    HDL 37 (L) 02/15/2022    LDL 95 02/15/2022    TRIG 114 02/15/2022         Electrocardiogram (EKG)   Covered if needed at [employer] to Medicare, and non-screening if indicated for medical reasons 06/23/2021      Ultrasound Screening for Abdominal Aortic Aneurysm (AAA) Covered once in a lifetime for one of the following risk factors    Men who are 73-68 years old and have ever smoked    Anyone with a family history -     Colorectal Cancer Screening  Covered for ages 52-80; only need ONE of the following:    Colonoscopy   Covered every 10 years    Covered every 2 years if patient is at high risk or previous colonoscopy was abnormal 09/04/2018    Colorectal Cancer Screening due on 09/04/2028    Flexible Sigmoidoscopy   Covered every 4 years -    Fecal Occult Blood Test Covered annually -   Prostate Cancer Screening    Prostate-Specific Antigen (PSA) Annually Lab Results   Component Value Date    PSA 11.60 (H) 02/15/2022     PSA due on 02/15/2024 Immunizations    Influenza Covered once per flu season  Please get every year 12/06/2022  No recommendations at this time    Pneumococcal Each vaccine ([id number] & [id number]) covered once after 65 Prevnar 13: 12/19/2018    [id number]: 12/20/2019     No recommendations at this time    Hepatitis B One screening covered for patients with certain risk factors   -  No recommendations at this time    Tetanus Toxoid Not covered by Medicare Part B unless medically necessary (cut with metal); may be covered with your pharmacy prescription benefits -    Tetanus, Diptheria and Pertusis TD and TDaP Not covered by Medicare Part B -  No recommendations at this time    Zoster Not covered by Medicare Part B; may be covered with your pharmacy  prescription benefits -  Zoster Vaccines(1 of 2) Never done       Annual Monitoring of Persistent Medications (ACE/ARB, digoxin diuretics, anticonvulsants)    Potassium Annually Lab Results   Component Value Date    K 4.8 02/15/2022         Creatinine   Annually Lab Results   Component Value Date    CREATSERUM 1.09 02/15/2022         BUN Annually Lab Results   Component Value Date    BUN 16 02/15/2022       Drug Serum Conc Annually No results found for: DIGOXIN, DIG, VALP

## 2023-02-23 ENCOUNTER — LAB ENCOUNTER (OUTPATIENT)
Dept: LAB | Age: 68
End: 2023-02-23
Attending: FAMILY MEDICINE
Payer: MEDICARE

## 2023-02-23 DIAGNOSIS — D58.2 ELEVATED HEMOGLOBIN (HCC): ICD-10-CM

## 2023-02-23 DIAGNOSIS — R73.9 HYPERGLYCEMIA: ICD-10-CM

## 2023-02-23 DIAGNOSIS — R97.20 ELEVATED PSA: ICD-10-CM

## 2023-02-23 DIAGNOSIS — I10 ESSENTIAL HYPERTENSION: ICD-10-CM

## 2023-02-23 DIAGNOSIS — E78.5 DYSLIPIDEMIA: ICD-10-CM

## 2023-02-23 LAB
ALBUMIN SERPL-MCNC: 3.9 G/DL (ref 3.4–5)
ALBUMIN/GLOB SERPL: 1.1 {RATIO} (ref 1–2)
ALP LIVER SERPL-CCNC: 86 U/L
ALT SERPL-CCNC: 31 U/L
ANION GAP SERPL CALC-SCNC: 5 MMOL/L (ref 0–18)
AST SERPL-CCNC: 24 U/L (ref 15–37)
BASOPHILS # BLD AUTO: 0.03 X10(3) UL (ref 0–0.2)
BASOPHILS NFR BLD AUTO: 0.6 %
BILIRUB SERPL-MCNC: 0.8 MG/DL (ref 0.1–2)
BILIRUB UR QL STRIP.AUTO: NEGATIVE
BUN BLD-MCNC: 15 MG/DL (ref 7–18)
CALCIUM BLD-MCNC: 9.1 MG/DL (ref 8.5–10.1)
CHLORIDE SERPL-SCNC: 108 MMOL/L (ref 98–112)
CHOLEST SERPL-MCNC: 159 MG/DL (ref ?–200)
CLARITY UR REFRACT.AUTO: CLEAR
CO2 SERPL-SCNC: 27 MMOL/L (ref 21–32)
COLOR UR AUTO: YELLOW
CREAT BLD-MCNC: 1.1 MG/DL
EOSINOPHIL # BLD AUTO: 0.05 X10(3) UL (ref 0–0.7)
EOSINOPHIL NFR BLD AUTO: 1 %
ERYTHROCYTE [DISTWIDTH] IN BLOOD BY AUTOMATED COUNT: 13.8 %
EST. AVERAGE GLUCOSE BLD GHB EST-MCNC: 117 MG/DL (ref 68–126)
FASTING PATIENT LIPID ANSWER: YES
FASTING STATUS PATIENT QL REPORTED: YES
GFR SERPLBLD BASED ON 1.73 SQ M-ARVRAT: 73 ML/MIN/1.73M2 (ref 60–?)
GLOBULIN PLAS-MCNC: 3.4 G/DL (ref 2.8–4.4)
GLUCOSE BLD-MCNC: 97 MG/DL (ref 70–99)
GLUCOSE UR STRIP.AUTO-MCNC: NEGATIVE MG/DL
HBA1C MFR BLD: 5.7 % (ref ?–5.7)
HCT VFR BLD AUTO: 52.6 %
HDLC SERPL-MCNC: 48 MG/DL (ref 40–59)
HGB BLD-MCNC: 17.3 G/DL
IMM GRANULOCYTES # BLD AUTO: 0.01 X10(3) UL (ref 0–1)
IMM GRANULOCYTES NFR BLD: 0.2 %
KETONES UR STRIP.AUTO-MCNC: NEGATIVE MG/DL
LDLC SERPL CALC-MCNC: 99 MG/DL (ref ?–100)
LEUKOCYTE ESTERASE UR QL STRIP.AUTO: NEGATIVE
LYMPHOCYTES # BLD AUTO: 1.59 X10(3) UL (ref 1–4)
LYMPHOCYTES NFR BLD AUTO: 32.1 %
MCH RBC QN AUTO: 30.5 PG (ref 26–34)
MCHC RBC AUTO-ENTMCNC: 32.9 G/DL (ref 31–37)
MCV RBC AUTO: 92.8 FL
MONOCYTES # BLD AUTO: 0.57 X10(3) UL (ref 0.1–1)
MONOCYTES NFR BLD AUTO: 11.5 %
NEUTROPHILS # BLD AUTO: 2.71 X10 (3) UL (ref 1.5–7.7)
NEUTROPHILS # BLD AUTO: 2.71 X10(3) UL (ref 1.5–7.7)
NEUTROPHILS NFR BLD AUTO: 54.6 %
NITRITE UR QL STRIP.AUTO: NEGATIVE
NONHDLC SERPL-MCNC: 111 MG/DL (ref ?–130)
OSMOLALITY SERPL CALC.SUM OF ELEC: 291 MOSM/KG (ref 275–295)
PH UR STRIP.AUTO: 5 [PH] (ref 5–8)
PLATELET # BLD AUTO: 205 10(3)UL (ref 150–450)
POTASSIUM SERPL-SCNC: 4.4 MMOL/L (ref 3.5–5.1)
PROT SERPL-MCNC: 7.3 G/DL (ref 6.4–8.2)
PROT UR STRIP.AUTO-MCNC: NEGATIVE MG/DL
RBC # BLD AUTO: 5.67 X10(6)UL
SODIUM SERPL-SCNC: 140 MMOL/L (ref 136–145)
SP GR UR STRIP.AUTO: 1.01 (ref 1–1.03)
TRIGL SERPL-MCNC: 61 MG/DL (ref 30–149)
TSI SER-ACNC: 1.26 MIU/ML (ref 0.36–3.74)
UROBILINOGEN UR STRIP.AUTO-MCNC: <2 MG/DL
VLDLC SERPL CALC-MCNC: 10 MG/DL (ref 0–30)
WBC # BLD AUTO: 5 X10(3) UL (ref 4–11)

## 2023-02-23 PROCEDURE — 81001 URINALYSIS AUTO W/SCOPE: CPT

## 2023-02-23 PROCEDURE — 83036 HEMOGLOBIN GLYCOSYLATED A1C: CPT

## 2023-02-23 PROCEDURE — 84443 ASSAY THYROID STIM HORMONE: CPT

## 2023-02-23 PROCEDURE — 80061 LIPID PANEL: CPT

## 2023-02-23 PROCEDURE — 80053 COMPREHEN METABOLIC PANEL: CPT

## 2023-02-23 PROCEDURE — 85025 COMPLETE CBC W/AUTO DIFF WBC: CPT

## 2023-03-11 ENCOUNTER — HOSPITAL ENCOUNTER (OUTPATIENT)
Dept: CT IMAGING | Age: 68
Discharge: HOME | End: 2023-03-11
Attending: FAMILY MEDICINE

## 2023-03-11 DIAGNOSIS — Z13.6 SCREENING FOR HEART DISEASE: ICD-10-CM

## 2023-08-22 ENCOUNTER — OFFICE VISIT (OUTPATIENT)
Dept: FAMILY MEDICINE CLINIC | Facility: CLINIC | Age: 68
End: 2023-08-22
Payer: MEDICARE

## 2023-08-22 ENCOUNTER — LAB ENCOUNTER (OUTPATIENT)
Dept: LAB | Age: 68
End: 2023-08-22
Attending: FAMILY MEDICINE
Payer: MEDICARE

## 2023-08-22 VITALS
RESPIRATION RATE: 14 BRPM | HEART RATE: 62 BPM | SYSTOLIC BLOOD PRESSURE: 124 MMHG | TEMPERATURE: 98 F | DIASTOLIC BLOOD PRESSURE: 82 MMHG | WEIGHT: 197.81 LBS | BODY MASS INDEX: 26.79 KG/M2 | HEIGHT: 72 IN

## 2023-08-22 DIAGNOSIS — R73.9 HYPERGLYCEMIA: ICD-10-CM

## 2023-08-22 DIAGNOSIS — I10 ESSENTIAL HYPERTENSION: Primary | ICD-10-CM

## 2023-08-22 DIAGNOSIS — R97.20 ELEVATED PSA: ICD-10-CM

## 2023-08-22 DIAGNOSIS — M77.12 LATERAL EPICONDYLITIS OF LEFT ELBOW: ICD-10-CM

## 2023-08-22 DIAGNOSIS — I10 ESSENTIAL HYPERTENSION: ICD-10-CM

## 2023-08-22 LAB
ALBUMIN SERPL-MCNC: 4.3 G/DL (ref 3.4–5)
ALBUMIN/GLOB SERPL: 1.2 {RATIO} (ref 1–2)
ALP LIVER SERPL-CCNC: 70 U/L
ALT SERPL-CCNC: 32 U/L
ANION GAP SERPL CALC-SCNC: 4 MMOL/L (ref 0–18)
AST SERPL-CCNC: 26 U/L (ref 15–37)
BILIRUB SERPL-MCNC: 1.1 MG/DL (ref 0.1–2)
BUN BLD-MCNC: 17 MG/DL (ref 7–18)
CALCIUM BLD-MCNC: 9.4 MG/DL (ref 8.5–10.1)
CHLORIDE SERPL-SCNC: 107 MMOL/L (ref 98–112)
CO2 SERPL-SCNC: 28 MMOL/L (ref 21–32)
CREAT BLD-MCNC: 1.22 MG/DL
EGFRCR SERPLBLD CKD-EPI 2021: 64 ML/MIN/1.73M2 (ref 60–?)
EST. AVERAGE GLUCOSE BLD GHB EST-MCNC: 120 MG/DL (ref 68–126)
FASTING STATUS PATIENT QL REPORTED: YES
GLOBULIN PLAS-MCNC: 3.6 G/DL (ref 2.8–4.4)
GLUCOSE BLD-MCNC: 95 MG/DL (ref 70–99)
HBA1C MFR BLD: 5.8 % (ref ?–5.7)
OSMOLALITY SERPL CALC.SUM OF ELEC: 289 MOSM/KG (ref 275–295)
POTASSIUM SERPL-SCNC: 4.4 MMOL/L (ref 3.5–5.1)
PROT SERPL-MCNC: 7.9 G/DL (ref 6.4–8.2)
SODIUM SERPL-SCNC: 139 MMOL/L (ref 136–145)

## 2023-08-22 PROCEDURE — 99214 OFFICE O/P EST MOD 30 MIN: CPT | Performed by: FAMILY MEDICINE

## 2023-08-22 PROCEDURE — 83036 HEMOGLOBIN GLYCOSYLATED A1C: CPT

## 2023-08-22 PROCEDURE — 80053 COMPREHEN METABOLIC PANEL: CPT

## 2023-08-22 RX ORDER — LISINOPRIL 10 MG/1
10 TABLET ORAL DAILY
Qty: 90 TABLET | Refills: 1 | Status: SHIPPED | OUTPATIENT
Start: 2023-08-22

## 2023-08-22 NOTE — PATIENT INSTRUCTIONS
Continue lisinopril. Healthy diet. Keep good hydration. Do blood work today. Buy left forearm brace for lateral epicondylitis. Wear it during the day.

## 2023-11-22 ENCOUNTER — MED REC SCAN ONLY (OUTPATIENT)
Dept: FAMILY MEDICINE CLINIC | Facility: CLINIC | Age: 68
End: 2023-11-22

## 2024-02-02 ENCOUNTER — MED REC SCAN ONLY (OUTPATIENT)
Dept: FAMILY MEDICINE CLINIC | Facility: CLINIC | Age: 69
End: 2024-02-02

## 2024-02-23 ENCOUNTER — LAB ENCOUNTER (OUTPATIENT)
Dept: LAB | Age: 69
End: 2024-02-23
Attending: FAMILY MEDICINE
Payer: MEDICARE

## 2024-02-23 ENCOUNTER — OFFICE VISIT (OUTPATIENT)
Dept: FAMILY MEDICINE CLINIC | Facility: CLINIC | Age: 69
End: 2024-02-23
Payer: MEDICARE

## 2024-02-23 VITALS
RESPIRATION RATE: 12 BRPM | WEIGHT: 201 LBS | DIASTOLIC BLOOD PRESSURE: 77 MMHG | SYSTOLIC BLOOD PRESSURE: 122 MMHG | HEART RATE: 58 BPM | TEMPERATURE: 97 F | HEIGHT: 72 IN | BODY MASS INDEX: 27.22 KG/M2

## 2024-02-23 DIAGNOSIS — I10 ESSENTIAL HYPERTENSION: ICD-10-CM

## 2024-02-23 DIAGNOSIS — E78.5 DYSLIPIDEMIA: ICD-10-CM

## 2024-02-23 DIAGNOSIS — R97.20 ELEVATED PSA: ICD-10-CM

## 2024-02-23 DIAGNOSIS — G89.29 CHRONIC PAIN OF RIGHT ANKLE: ICD-10-CM

## 2024-02-23 DIAGNOSIS — R73.9 HYPERGLYCEMIA: ICD-10-CM

## 2024-02-23 DIAGNOSIS — M25.571 CHRONIC PAIN OF RIGHT ANKLE: ICD-10-CM

## 2024-02-23 DIAGNOSIS — D58.2 ELEVATED HEMOGLOBIN (HCC): ICD-10-CM

## 2024-02-23 DIAGNOSIS — Z00.00 ENCOUNTER FOR ANNUAL HEALTH EXAMINATION: Primary | ICD-10-CM

## 2024-02-23 LAB
ALBUMIN SERPL-MCNC: 4.1 G/DL (ref 3.4–5)
ALBUMIN/GLOB SERPL: 1.1 {RATIO} (ref 1–2)
ALP LIVER SERPL-CCNC: 79 U/L
ALT SERPL-CCNC: 35 U/L
ANION GAP SERPL CALC-SCNC: 5 MMOL/L (ref 0–18)
AST SERPL-CCNC: 28 U/L (ref 15–37)
BASOPHILS # BLD AUTO: 0.03 X10(3) UL (ref 0–0.2)
BASOPHILS NFR BLD AUTO: 0.5 %
BILIRUB SERPL-MCNC: 0.9 MG/DL (ref 0.1–2)
BILIRUB UR QL STRIP.AUTO: NEGATIVE
BUN BLD-MCNC: 17 MG/DL (ref 9–23)
CALCIUM BLD-MCNC: 9.9 MG/DL (ref 8.5–10.1)
CHLORIDE SERPL-SCNC: 108 MMOL/L (ref 98–112)
CHOLEST SERPL-MCNC: 175 MG/DL (ref ?–200)
CLARITY UR REFRACT.AUTO: CLEAR
CO2 SERPL-SCNC: 26 MMOL/L (ref 21–32)
COLOR UR AUTO: YELLOW
CREAT BLD-MCNC: 1.11 MG/DL
EGFRCR SERPLBLD CKD-EPI 2021: 71 ML/MIN/1.73M2 (ref 60–?)
EOSINOPHIL # BLD AUTO: 0.03 X10(3) UL (ref 0–0.7)
EOSINOPHIL NFR BLD AUTO: 0.5 %
ERYTHROCYTE [DISTWIDTH] IN BLOOD BY AUTOMATED COUNT: 13.2 %
EST. AVERAGE GLUCOSE BLD GHB EST-MCNC: 126 MG/DL (ref 68–126)
FASTING PATIENT LIPID ANSWER: YES
FASTING STATUS PATIENT QL REPORTED: YES
GLOBULIN PLAS-MCNC: 3.9 G/DL (ref 2.8–4.4)
GLUCOSE BLD-MCNC: 98 MG/DL (ref 70–99)
GLUCOSE UR STRIP.AUTO-MCNC: NORMAL MG/DL
HBA1C MFR BLD: 6 % (ref ?–5.7)
HCT VFR BLD AUTO: 53.3 %
HDLC SERPL-MCNC: 41 MG/DL (ref 40–59)
HGB BLD-MCNC: 17.9 G/DL
IMM GRANULOCYTES # BLD AUTO: 0.01 X10(3) UL (ref 0–1)
IMM GRANULOCYTES NFR BLD: 0.2 %
KETONES UR STRIP.AUTO-MCNC: NEGATIVE MG/DL
LDLC SERPL CALC-MCNC: 118 MG/DL (ref ?–100)
LEUKOCYTE ESTERASE UR QL STRIP.AUTO: NEGATIVE
LYMPHOCYTES # BLD AUTO: 1.61 X10(3) UL (ref 1–4)
LYMPHOCYTES NFR BLD AUTO: 25.3 %
MCH RBC QN AUTO: 30.8 PG (ref 26–34)
MCHC RBC AUTO-ENTMCNC: 33.6 G/DL (ref 31–37)
MCV RBC AUTO: 91.7 FL
MONOCYTES # BLD AUTO: 0.53 X10(3) UL (ref 0.1–1)
MONOCYTES NFR BLD AUTO: 8.3 %
NEUTROPHILS # BLD AUTO: 4.16 X10 (3) UL (ref 1.5–7.7)
NEUTROPHILS # BLD AUTO: 4.16 X10(3) UL (ref 1.5–7.7)
NEUTROPHILS NFR BLD AUTO: 65.2 %
NITRITE UR QL STRIP.AUTO: NEGATIVE
NONHDLC SERPL-MCNC: 134 MG/DL (ref ?–130)
OSMOLALITY SERPL CALC.SUM OF ELEC: 290 MOSM/KG (ref 275–295)
PH UR STRIP.AUTO: 5 [PH] (ref 5–8)
PLATELET # BLD AUTO: 191 10(3)UL (ref 150–450)
POTASSIUM SERPL-SCNC: 4.2 MMOL/L (ref 3.5–5.1)
PROT SERPL-MCNC: 8 G/DL (ref 6.4–8.2)
PROT UR STRIP.AUTO-MCNC: NEGATIVE MG/DL
PSA SERPL-MCNC: 15.1 NG/ML (ref ?–4)
RBC # BLD AUTO: 5.81 X10(6)UL
RBC UR QL AUTO: NEGATIVE
SODIUM SERPL-SCNC: 139 MMOL/L (ref 136–145)
SP GR UR STRIP.AUTO: 1.02 (ref 1–1.03)
TRIGL SERPL-MCNC: 84 MG/DL (ref 30–149)
TSI SER-ACNC: 1.14 MIU/ML (ref 0.36–3.74)
UROBILINOGEN UR STRIP.AUTO-MCNC: NORMAL MG/DL
VLDLC SERPL CALC-MCNC: 15 MG/DL (ref 0–30)
WBC # BLD AUTO: 6.4 X10(3) UL (ref 4–11)

## 2024-02-23 PROCEDURE — 81003 URINALYSIS AUTO W/O SCOPE: CPT

## 2024-02-23 PROCEDURE — 84443 ASSAY THYROID STIM HORMONE: CPT

## 2024-02-23 PROCEDURE — 80053 COMPREHEN METABOLIC PANEL: CPT

## 2024-02-23 PROCEDURE — 85025 COMPLETE CBC W/AUTO DIFF WBC: CPT

## 2024-02-23 PROCEDURE — 84153 ASSAY OF PSA TOTAL: CPT

## 2024-02-23 PROCEDURE — 80061 LIPID PANEL: CPT

## 2024-02-23 PROCEDURE — 83036 HEMOGLOBIN GLYCOSYLATED A1C: CPT

## 2024-02-23 RX ORDER — LISINOPRIL 10 MG/1
10 TABLET ORAL DAILY
Qty: 90 TABLET | Refills: 1 | Status: SHIPPED | OUTPATIENT
Start: 2024-02-23

## 2024-02-23 NOTE — PATIENT INSTRUCTIONS
Continue current meds.   Watch diet for fats and carbs.   Stay active.    Schedule physical therapy.  Call office after physical therapy if not better.        Rigo Solomon's SCREENING SCHEDULE   Tests on this list are recommended by your physician but may not be covered, or covered at this frequency, by your insurer.   Please check with your insurance carrier before scheduling to verify coverage.   PREVENTATIVE SERVICES FREQUENCY &  COVERAGE DETAILS LAST COMPLETION DATE   Diabetes Screening    Fasting Blood Sugar / Glucose    One screening every 12 months if never tested or if previously tested but not diagnosed with pre-diabetes   One screening every 6 months if diagnosed with pre-diabetes Lab Results   Component Value Date    GLU 95 08/22/2023        Cardiovascular Disease Screening    Lipid Panel  Cholesterol  Lipoprotein (HDL)  Triglycerides Covered every 5 years for all Medicare beneficiaries without apparent signs or symptoms of cardiovascular disease Lab Results   Component Value Date    CHOLEST 159 02/23/2023    HDL 48 02/23/2023    LDL 99 02/23/2023    TRIG 61 02/23/2023         Electrocardiogram (EKG)   Covered if needed at [facility] to Medicare, and non-screening if indicated for medical reasons 06/23/2021      Ultrasound Screening for Abdominal Aortic Aneurysm (AAA) Covered once in a lifetime for one of the following risk factors    Men who are 65-75 years old and have ever smoked    Anyone with a family history -     Colorectal Cancer Screening  Covered for ages 50-85; only need ONE of the following:    Colonoscopy   Covered every 10 years    Covered every 2 years if patient is at high risk or previous colonoscopy was abnormal 09/04/2018    Health Maintenance   Topic Date Due    Colorectal Cancer Screening  09/04/2028       Flexible Sigmoidoscopy   Covered every 4 years -    Fecal Occult Blood Test Covered annually -   Prostate Cancer Screening    Prostate-Specific Antigen (PSA) Annually Lab Results    Component Value Date    PSA 11.60 (H) 02/15/2022     Health Maintenance   Topic Date Due    PSA  02/15/2024      Immunizations    Influenza Covered once per flu season  Please get every year 09/14/2023  No recommendations at this time    Pneumococcal Each vaccine ([id number] & [id number]) covered once after 65 Prevnar 13: 12/19/2018    [id number]: 12/20/2019     No recommendations at this time    Hepatitis B One screening covered for patients with certain risk factors   -  No recommendations at this time    Tetanus Toxoid Not covered by Medicare Part B unless medically necessary (cut with metal); may be covered with your pharmacy prescription benefits -    Tetanus, Diptheria and Pertusis TD and TDaP Not covered by Medicare Part B -  No recommendations at this time    Zoster Not covered by Medicare Part B; may be covered with your pharmacy  prescription benefits -  No recommendations at this time     Annual Monitoring of Persistent Medications (ACE/ARB, digoxin diuretics, anticonvulsants)    Potassium Annually Lab Results   Component Value Date    K 4.4 08/22/2023         Creatinine   Annually Lab Results   Component Value Date    CREATSERUM 1.22 08/22/2023         BUN Annually Lab Results   Component Value Date    BUN 17 08/22/2023       Drug Serum Conc Annually No results found for: \"DIGOXIN\", \"DIG\", \"VALP\"

## 2024-02-23 NOTE — PROGRESS NOTES
Subjective:   Rigo Solomon is a 68 year old male who presents for a Medicare Subsequent Annual Wellness visit (Pt already had Initial Annual Wellness).  Also discussed right ankle pain and follow-up on hypertension, hyperglycemia, low HDL.    Patient complains of having right ankle pain lateral aspect.  He is afraid of the problem of the Achilles tendon.  It happens after he is walking or standing on his feet for too long.  The pain is worse in the morning when he is getting up she feels that the ankle is stiff.  The pain can be 7 out of 10 intensity has a hard time to get down the stairs.  Feels that the ankle is unstable.  No history of direct injury but he had on and off problems for a while.  He is willing to try the physical therapy.  May need to see ankle specialist if not improvement.    Hypertension blood pressure stable on lisinopril 10 mg daily.  He is doing well with medication.  Patient denies any side effects.  No chest pain no shortness of breath no palpitations no headaches.     Prostate enlargement will check a PSA , patient was seen urologist seeing Dr. Abad.  Had MRI done which showed just enlarged prostate it is monitored.   Patient would like PSA ordered to make sure that it is monitored.  Hyperglycemia sugar was elevated-requires close monitoring.  Patient is watching diet discussed low-carb diet again.  Further recommendation pending blood work results.     Low HDL we will check blood work next year.  Watching diet.  We will monitor blood work.  He is to be monitored.    Patient has  stress related to his wife who has MS and some memory problems worrying about her.  Right now stable.     Patient elevated hemoglobin level of the blood work monitor    History/Other:   Fall Risk Assessment:   He has been screened for Falls and is low risk.      Cognitive Assessment:   He had a completely normal cognitive assessment - see flowsheet entries     Functional Ability/Status:   Rigo Solomon has a  completely normal functional assessment. See flowsheet for details.        Depression Screening (PHQ-2/PHQ-9): PHQ-2 SCORE: 0  , done 2/23/2024        Advanced Directives:   He does NOT have a Living Will. [Do you have a living will?: No]  He does have a POA but we do NOT have it on file in Epic.    Discussed Advance Care Planning with patient (and family/surrogate if present). Standard forms made available to patient in After Visit Summary.      Patient Active Problem List   Diagnosis   • Lateral epicondylitis   • Tendinitis of left rotator cuff   • Family history of Parkinson's disease   • Essential hypertension   • Hyperglycemia   • Dyslipidemia   • Elevated hemoglobin (HCC)   • Squamous cell carcinoma of skin of lower extremity   • Basal cell carcinoma (BCC) of right lower leg   • Personal history of malignant neoplasm of skin   • Discoloration of skin of toe   • Elevated PSA measurement     Allergies:  He is allergic to naprosyn [naproxen].    Current Medications:  Outpatient Medications Marked as Taking for the 2/23/24 encounter (Office Visit) with Kaylin Lyon MD   Medication Sig   • lisinopril 10 MG Oral Tab Take 1 tablet (10 mg total) by mouth daily.       Medical History:  He  has a past medical history of Essential hypertension and Seasonal allergies.  Surgical History:  He  has a past surgical history that includes anal sphincterotomy; oral surgery procedure (1990); vasectomy (1985); tonsillectomy (1966); other surgical history (Aug, 2015); hemorrhoidectomy (2008); and colonoscopy (2008).   Family History:  His family history includes Breast Cancer in an other family member; Cancer in his maternal grandfather and sister; Heart Disease in his paternal grandfather; Hypertension in his sister; Other in his paternal grandfather and sister; Parkinsons in his mother; parkinsons in his maternal grandmother.  Social History:  He  reports that he has never smoked. He has never used smokeless tobacco. He  reports that he does not drink alcohol and does not use drugs.    Tobacco:  He has never smoked tobacco.    CAGE Alcohol Screen:   CAGE screening score of 0 on 2/23/2024, showing low risk of alcohol abuse.      Patient Care Team:  Kaylin Lyon MD as PCP - General (Family Medicine)    Review of Systems  GENERAL: feels well otherwise  SKIN: denies any unusual skin lesions  EYES: denies blurred vision or double vision  HEENT: denies nasal congestion, sinus pain or ST  LUNGS: denies shortness of breath with exertion  CARDIOVASCULAR: denies chest pain on exertion  GI: denies abdominal pain, denies heartburn  : 1 per night nocturia, no complaint of urinary incontinence  MUSCULOSKELETAL: denies back pain  NEURO: denies headaches  PSYCHE: denies depression or anxiety  HEMATOLOGIC: denies hx of anemia  ENDOCRINE: denies thyroid history  ALL/ASTHMA: denies hx of allergy or asthma    Objective:   Physical Exam  General Appearance:  Alert, cooperative, no distress, appears stated age   Head:  Normocephalic, without obvious abnormality, atraumatic   Eyes:  PERRL, conjunctiva/corneas clear, EOM's intact, both eyes   Ears:  Normal TM's and external ear canals, both ears   Nose: Nares normal, septum midline, mucosa normal, no drainage or sinus tenderness   Throat: Lips, mucosa, and tongue normal; teeth and gums normal   Neck: Supple, symmetrical, trachea midline, no adenopathy, thyroid: not enlarged, symmetric, no tenderness/mass/nodules,    Back:   Symmetric, no curvature, ROM normal, no CVA tenderness   Lungs:   Clear to auscultation bilaterally, respirations unlabored   Chest Wall:  No tenderness or deformity   Heart:  Regular rate and rhythm, S1, S2 normal, no murmur, rub or gallop   Abdomen:   Soft, non-tender, bowel sounds active all four quadrants,  no masses, no organomegaly   Genitalia:  By urologist   Rectal:  Done by urologist   Extremities: Extremities normal, atraumatic, no cyanosis or edema   Pulses: 2+ and  symmetric   Skin: Skin color, texture, turgor normal, no rashes or lesions   Lymph nodes: Cervical, supraclavicular, and axillary nodes normal   Neurologic: Normal         /77 (BP Location: Left arm, Patient Position: Sitting, Cuff Size: adult)   Pulse 58   Temp 96.6 °F (35.9 °C) (Temporal)   Resp 12   Ht 6' (1.829 m)   Wt 201 lb (91.2 kg)   BMI 27.26 kg/m²  Estimated body mass index is 27.26 kg/m² as calculated from the following:    Height as of this encounter: 6' (1.829 m).    Weight as of this encounter: 201 lb (91.2 kg).    Medicare Hearing Assessment:   Hearing Screening    Time taken: 2/23/2024  7:33 AM  Screening Method: Finger Rub             Results for orders placed or performed in visit on 08/22/23   Hemoglobin A1C   Result Value Ref Range    HgbA1C 5.8 (H) <5.7 %    Estimated Average Glucose 120 68 - 126 mg/dL   Comp Metabolic Panel (14)   Result Value Ref Range    Glucose 95 70 - 99 mg/dL    Sodium 139 136 - 145 mmol/L    Potassium 4.4 3.5 - 5.1 mmol/L    Chloride 107 98 - 112 mmol/L    CO2 28.0 21.0 - 32.0 mmol/L    Anion Gap 4 0 - 18 mmol/L    BUN 17 7 - 18 mg/dL    Creatinine 1.22 0.70 - 1.30 mg/dL    Calcium, Total 9.4 8.5 - 10.1 mg/dL    Calculated Osmolality 289 275 - 295 mOsm/kg    eGFR-Cr 64 >=60 mL/min/1.73m2    AST 26 15 - 37 U/L    ALT 32 16 - 61 U/L    Alkaline Phosphatase 70 45 - 117 U/L    Bilirubin, Total 1.1 0.1 - 2.0 mg/dL    Total Protein 7.9 6.4 - 8.2 g/dL    Albumin 4.3 3.4 - 5.0 g/dL    Globulin  3.6 2.8 - 4.4 g/dL    A/G Ratio 1.2 1.0 - 2.0    Patient Fasting for CMP? Yes      Assessment & Plan:   Rigo Solomon is a 68 year old male who presents for a Medicare Assessment.     1. Encounter for annual health examination (Primary)  2. Hyperglycemia  -     Hemoglobin A1C; Future; Expected date: 02/23/2024  3. Essential hypertension  -     CBC With Differential With Platelet; Future; Expected date: 02/23/2024  -     Urinalysis with Culture Reflex; Future; Expected date:  02/23/2024  -     Lisinopril; Take 1 tablet (10 mg total) by mouth daily.  Dispense: 90 tablet; Refill: 1  4. Dyslipidemia  -     Comp Metabolic Panel (14); Future; Expected date: 02/23/2024  -     Lipid Panel; Future; Expected date: 02/23/2024  -     TSH W Reflex To Free T4; Future; Expected date: 02/23/2024  5. Elevated PSA  -     PSA Total, Diagnostic; Future; Expected date: 02/23/2024  6. Elevated hemoglobin (HCC)  7. Chronic pain of right ankle  -     Physical Therapy Referral - [facility]    Elevated PSA patient seeing urologist,  blood test done today shows elevated PSA see urologist.      Hyperglycemia hemoglobin A1c 6.0 done todaywatch diet monitor.  Recheck before next visit.     Dyslipidemia low HDL monitor.  LDL came back elevated this time monitor.    Hypertension on lisinopril continue current medication monitor blood work    Elevated hemoglobin level  in the past monitor    Depression screening reviewed.  Time spent 8 minutes,  worrying about his wife.  Patient has supportive family.    Continue current meds.   Watch diet for fats and carbs.   Stay active.    Schedule physical therapy.  Call office after physical therapy if not better.    The patient indicates understanding of these issues and agrees to the plan.  Lab work ordered.  Reinforced healthy diet, lifestyle, and exercise.      No follow-ups on file.     Kaylin Lyon MD, 2/22/2023     Supplementary Documentation:   General Health:  In the past six months, have you lost more than 10 pounds without trying?: 2 - No  Has your appetite been poor?: No  Type of Diet: Balanced  How does the patient maintain a good energy level?: Appropriate Exercise  How would you describe your daily physical activity?: Moderate  How would you describe your current health state?: Good  How do you maintain positive mental well-being?: Social Interaction;Puzzles;Games;Visiting Friends;Visiting Family  On a scale of 0 to 10, with 0 being no pain and 10 being  severe pain, what is your pain level?: 1 - (Mild) (Rt ankle)  In the past six months, have you experienced urine leakage?: 0-No  At any time do you feel concerned for the safety/well-being of yourself and/or your children, in your home or elsewhere?: No  Have you had any immunizations at another office such as Influenza, Hepatitis B, Tetanus, or Pneumococcal?: Yes        Rigo Solomon's SCREENING SCHEDULE   Tests on this list are recommended by your physician but may not be covered, or covered at this frequency, by your insurer.   Please check with your insurance carrier before scheduling to verify coverage.   PREVENTATIVE SERVICES FREQUENCY &  COVERAGE DETAILS LAST COMPLETION DATE   Diabetes Screening    Fasting Blood Sugar / Glucose    One screening every 12 months if never tested or if previously tested but not diagnosed with pre-diabetes   One screening every 6 months if diagnosed with pre-diabetes Lab Results   Component Value Date    GLU 95 08/22/2023        Cardiovascular Disease Screening    Lipid Panel  Cholesterol  Lipoprotein (HDL)  Triglycerides Covered every 5 years for all Medicare beneficiaries without apparent signs or symptoms of cardiovascular disease Lab Results   Component Value Date    CHOLEST 159 02/23/2023    HDL 48 02/23/2023    LDL 99 02/23/2023    TRIG 61 02/23/2023         Electrocardiogram (EKG)   Covered [facility] at [facility], and non-screening if indicated for medical reasons 06/23/2021      Ultrasound Screening for Abdominal Aortic Aneurysm (AAA) Covered once in a lifetime for one of the following risk factors   • Men who are 65-75 years old and have ever smoked   • Anyone with a family history -     Colorectal Cancer Screening  Covered for ages 50-85; only need ONE of the following:    Colonoscopy   Covered every 10 years    Covered every 2 years if patient is at high risk or previous colonoscopy was abnormal 09/04/2018    Health Maintenance   Topic Date Due   • Colorectal  Cancer Screening  09/04/2028       Flexible Sigmoidoscopy   Covered every 4 years -    Fecal Occult Blood Test Covered annually -   Prostate Cancer Screening    Prostate-Specific Antigen (PSA) Annually Lab Results   Component Value Date    PSA 11.60 (H) 02/15/2022     Health Maintenance   Topic Date Due   • PSA  02/15/2024      Immunizations    Influenza Covered once per flu season  Please get every year 09/14/2023  No recommendations at this time    Pneumococcal Each vaccine ([id number] & [id number]) covered once after 65 Prevnar 13: 12/19/2018    Cstrjjwiu85: 12/20/2019     No recommendations at this time    Hepatitis B One screening covered for patients with certain risk factors   -  No recommendations at this time    Tetanus Toxoid Not covered by Medicare Part B unless medically necessary (cut with metal); may be covered with your pharmacy prescription benefits -    Tetanus, Diptheria and Pertusis TD and TDaP Not covered by Medicare Part B -  No recommendations at this time    Zoster Not covered by Medicare Part B; may be covered with your pharmacy  prescription benefits -  No recommendations at this time     Annual Monitoring of Persistent Medications (ACE/ARB, digoxin diuretics, anticonvulsants)    Potassium Annually Lab Results   Component Value Date    K 4.4 08/22/2023         Creatinine   Annually Lab Results   Component Value Date    CREATSERUM 1.22 08/22/2023         BUN Annually Lab Results   Component Value Date    BUN 17 08/22/2023       Drug Serum Conc Annually No results found for: \"DIGOXIN\", \"DIG\", \"VALP\"

## 2024-03-11 ENCOUNTER — OFFICE VISIT (OUTPATIENT)
Dept: FAMILY MEDICINE CLINIC | Facility: CLINIC | Age: 69
End: 2024-03-11
Payer: MEDICARE

## 2024-03-11 VITALS
TEMPERATURE: 98 F | DIASTOLIC BLOOD PRESSURE: 58 MMHG | HEIGHT: 72 IN | WEIGHT: 200 LBS | SYSTOLIC BLOOD PRESSURE: 100 MMHG | BODY MASS INDEX: 27.09 KG/M2 | RESPIRATION RATE: 16 BRPM | OXYGEN SATURATION: 100 % | HEART RATE: 60 BPM

## 2024-03-11 DIAGNOSIS — H69.92 DYSFUNCTION OF LEFT EUSTACHIAN TUBE: ICD-10-CM

## 2024-03-11 DIAGNOSIS — H92.02 LEFT EAR PAIN: Primary | ICD-10-CM

## 2024-03-11 PROCEDURE — 99213 OFFICE O/P EST LOW 20 MIN: CPT | Performed by: NURSE PRACTITIONER

## 2024-03-11 RX ORDER — FLUTICASONE PROPIONATE 50 MCG
1 SPRAY, SUSPENSION (ML) NASAL 2 TIMES DAILY
Qty: 1 EACH | Refills: 2 | Status: SHIPPED | OUTPATIENT
Start: 2024-03-11 | End: 2024-04-10

## 2024-03-11 NOTE — PATIENT INSTRUCTIONS
Please start Flonase 1 spray each nostril twice daily before brushing teeth. Use for at least one to two weeks. Ear pressure should resolve within a few days but consistency to decrease inflammation may be needed longer.  May take Tylenol if needed for pain. Warm pack to outer ear may be comfortable as well.  Follow up with Dr. Lyon if symptoms persist. Seek emergency care if dizziness, one sided headache, vision changes, facial droop or worsening pain occurs.

## 2024-03-11 NOTE — PROGRESS NOTES
Subjective:   Patient ID: Rigo Solomon is a 68 year old male.    Ear Pain   There is pain in the left ear. This is a new problem. Episode onset: in the past two weeks. The problem occurs constantly. The problem has been waxing and waning. There has been no fever. The pain is mild. Associated symptoms comments: Mild nasal stuffiness. He has tried acetaminophen for the symptoms. The treatment provided mild relief.       History/Other:   Review of Systems   HENT:  Positive for ear pain.         As above in HPI   All other systems reviewed and are negative.    Current Outpatient Medications   Medication Sig Dispense Refill    fluticasone propionate 50 MCG/ACT Nasal Suspension 1 spray by Each Nare route 2 (two) times daily. 1 each 2    lisinopril 10 MG Oral Tab Take 1 tablet (10 mg total) by mouth daily. 90 tablet 1     Allergies:  Allergies   Allergen Reactions    Naprosyn [Naproxen] RASH     Rash up and down arms     /58   Pulse 60   Temp 97.9 °F (36.6 °C) (Oral)   Resp 16   Ht 6' (1.829 m)   Wt 200 lb (90.7 kg)   SpO2 100%   BMI 27.12 kg/m²     Objective:   Physical Exam  Constitutional:       General: He is not in acute distress.     Appearance: Normal appearance. He is not ill-appearing.   HENT:      Head: Normocephalic and atraumatic.      Right Ear: Tympanic membrane, ear canal and external ear normal. There is no impacted cerumen.      Left Ear: There is no impacted cerumen.      Ears:      Comments: Left ear with wax partially blocking clear view of TM, canal narrow, wax easily removed with curette, TM opaque with normal position.     Nose: No congestion.      Right Turbinates: Enlarged, swollen and pale.      Left Turbinates: Enlarged, swollen and pale.      Right Sinus: No maxillary sinus tenderness or frontal sinus tenderness.      Left Sinus: No maxillary sinus tenderness or frontal sinus tenderness.      Mouth/Throat:      Mouth: Mucous membranes are moist.      Pharynx: Uvula midline. No  oropharyngeal exudate.      Comments: Scant PND noted, no cobblestoning noted  Eyes:      Extraocular Movements: Extraocular movements intact.      Conjunctiva/sclera: Conjunctivae normal.      Pupils: Pupils are equal, round, and reactive to light.   Cardiovascular:      Rate and Rhythm: Normal rate and regular rhythm.      Pulses: Normal pulses.      Heart sounds: Normal heart sounds.   Pulmonary:      Effort: Pulmonary effort is normal. No respiratory distress.      Breath sounds: Normal breath sounds.   Musculoskeletal:         General: Normal range of motion.      Cervical back: Normal range of motion and neck supple.   Lymphadenopathy:      Cervical: No cervical adenopathy.   Skin:     General: Skin is warm and dry.   Neurological:      Mental Status: He is alert. Mental status is at baseline.   Psychiatric:         Mood and Affect: Mood normal.         Behavior: Behavior normal.         Assessment & Plan:   1. Left ear pain    2. Dysfunction of left eustachian tube        No orders of the defined types were placed in this encounter.      Meds This Visit:  Requested Prescriptions     Signed Prescriptions Disp Refills    fluticasone propionate 50 MCG/ACT Nasal Suspension 1 each 2     Si spray by Each Nare route 2 (two) times daily.       Imaging & Referrals:  None

## 2024-06-12 ENCOUNTER — APPOINTMENT (OUTPATIENT)
Dept: GENERAL RADIOLOGY | Age: 69
End: 2024-06-12
Attending: NURSE PRACTITIONER
Payer: MEDICARE

## 2024-06-12 ENCOUNTER — HOSPITAL ENCOUNTER (OUTPATIENT)
Age: 69
Discharge: HOME | End: 2024-06-12
Payer: MEDICARE

## 2024-06-12 VITALS
SYSTOLIC BLOOD PRESSURE: 126 MMHG | RESPIRATION RATE: 16 BRPM | TEMPERATURE: 98 F | OXYGEN SATURATION: 100 % | DIASTOLIC BLOOD PRESSURE: 73 MMHG | WEIGHT: 200 LBS | BODY MASS INDEX: 27.09 KG/M2 | HEART RATE: 64 BPM | HEIGHT: 72 IN

## 2024-06-12 DIAGNOSIS — S52.124A CLOSED NONDISPLACED FRACTURE OF HEAD OF RIGHT RADIUS, INITIAL ENCOUNTER: Primary | ICD-10-CM

## 2024-06-12 PROCEDURE — 99213 OFFICE O/P EST LOW 20 MIN: CPT

## 2024-06-12 PROCEDURE — 73080 X-RAY EXAM OF ELBOW: CPT | Performed by: NURSE PRACTITIONER

## 2024-06-12 RX ORDER — TAMSULOSIN HYDROCHLORIDE 0.4 MG/1
0.4 CAPSULE ORAL DAILY
COMMUNITY

## 2024-06-12 NOTE — ED PROVIDER NOTES
Patient Seen in: [facility]      History     Chief Complaint   Patient presents with    Arm or Hand Injury     Stated Complaint: right elbow injury    Subjective:   The history is provided by the patient.       Patient is a pleasant 68-year-old male here for evaluation of right elbow injury.  Injury was sustained this afternoon while playing pickle ball.  Patient is left-handed, and with the racquet in his left hand he went to hit the ball when he fell, which he believes he landed on his right forearm and elbow.  He continued to play for games after this injury.  When he got to the car, he noticed right elbow pain with flexion of the hand and external rotation of the elbow.  He has not taken over-the-counter medication for this.  Denies history of trauma to the right elbow.    Objective:   Past Medical History:    Essential hypertension    Seasonal allergies              Past Surgical History:   Procedure Laterality Date    Anal sphincterotomy      2012     Biopsy of prostate,incisional      negative    Colonoscopy  01/01/2008    due in 10 years,     Hemorrhoidectomy  01/01/2008    banding    Oral surgery procedure  01/01/1990    cyst    Other surgical history  08/01/2015    right wrist surgery    Tonsillectomy  01/01/1966    Vasectomy  01/01/1985                Social History     Socioeconomic History    Marital status:    Tobacco Use    Smoking status: Never    Smokeless tobacco: Never   Vaping Use    Vaping status: Never Used   Substance and Sexual Activity    Alcohol use: No     Alcohol/week: 0.0 standard drinks of alcohol    Drug use: No    Sexual activity: Yes   Other Topics Concern    Caffeine Concern No    Exercise Yes     Comment: walking     Seat Belt Yes    Self-Exams No              Review of Systems    Positive for stated complaint: right elbow injury  Other systems are as noted in HPI.  Constitutional and vital signs reviewed.      All other systems reviewed and negative except  as noted above.    Physical Exam     ED Triage Vitals [06/12/24 1206]   /73   Pulse 64   Resp 16   Temp 97.9 °F (36.6 °C)   Temp src Temporal   SpO2 100 %   O2 Device None (Room air)       Current Vitals:   Vital Signs  BP: 126/73  Pulse: 64  Resp: 16  Temp: 97.9 °F (36.6 °C)  Temp src: Temporal    Oxygen Therapy  SpO2: 100 %  O2 Device: None (Room air)            Physical Exam  Vitals and nursing note reviewed.   Constitutional:       General: He is not in acute distress.     Appearance: Normal appearance. He is not ill-appearing, toxic-appearing or diaphoretic.   Eyes:      Conjunctiva/sclera: Conjunctivae normal.      Pupils: Pupils are equal, round, and reactive to light.   Cardiovascular:      Rate and Rhythm: Normal rate.      Pulses: Normal pulses.   Pulmonary:      Effort: Pulmonary effort is normal.   Musculoskeletal:      Right elbow: Swelling present. Normal range of motion.      Right forearm: No edema or bony tenderness.      Comments: Mild right elbow edema. No ecchymosis, warmth or erythema. Skin is intact.  Proximal radial tenderness with extension.  Neurovascular status intact.   Neurological:      Mental Status: He is alert.             ED Course   Labs Reviewed - No data to display  XR ELBOW, COMPLETE (MIN 3 VIEWS), RIGHT (CPT=73080)    Result Date: 6/12/2024  CONCLUSION:  Acute fracture of the right radial head.   LOCATION:  [facility]    Dictated by (CST): Crow Ramos MD on 6/12/2024 at 12:32 PM     Finalized by (CST): Crow Ramos MD on 6/12/2024 at 12:32 PM                  [facility]                  Medical Decision Making  Differentials include but are not limited to sprain, contusion, and fracture.  X-ray does reveal radial head fracture.  Patient was placed in sling.  plan for rest, ice, sling and over-the-counter analgesic.  Close outpatient follow-up with Ortho.  Patient sees duly Ortho for left elbow tendinitis.  Will plan to follow-up outpatient with Dr. Kelly.    Amount and/or  Complexity of Data Reviewed  Radiology: ordered and independent interpretation performed. Decision-making details documented in ED Course.        Disposition and Plan     Clinical Impression:  1. Closed nondisplaced fracture of head of right radius, initial encounter         Disposition:  Discharge  6/12/2024 12:51 pm    Follow-up:  Fortunato Kelly MD  [address] [address]  [phone]    Schedule an appointment as soon as possible for a visit             Medications Prescribed:  Discharge Medication List as of 6/12/2024 12:52 PM

## 2024-06-12 NOTE — ED INITIAL ASSESSMENT (HPI)
Pt fell during pickle ball a couple of hours and ago and landed on R elbow - still with pain  
c/o abnormal vaginal bleeding/spotting since 10/2019

## 2024-07-10 ENCOUNTER — OFFICE VISIT (OUTPATIENT)
Dept: FAMILY MEDICINE CLINIC | Facility: CLINIC | Age: 69
End: 2024-07-10
Payer: MEDICARE

## 2024-07-10 ENCOUNTER — HOSPITAL ENCOUNTER (OUTPATIENT)
Dept: GENERAL RADIOLOGY | Age: 69
Discharge: HOME | End: 2024-07-10
Attending: PHYSICIAN ASSISTANT
Payer: MEDICARE

## 2024-07-10 VITALS
WEIGHT: 203.31 LBS | BODY MASS INDEX: 27.54 KG/M2 | HEIGHT: 72 IN | RESPIRATION RATE: 21 BRPM | HEART RATE: 65 BPM | DIASTOLIC BLOOD PRESSURE: 76 MMHG | SYSTOLIC BLOOD PRESSURE: 126 MMHG | OXYGEN SATURATION: 99 % | TEMPERATURE: 99 F

## 2024-07-10 DIAGNOSIS — R05.1 ACUTE COUGH: Primary | ICD-10-CM

## 2024-07-10 DIAGNOSIS — R09.89 CHEST CONGESTION: ICD-10-CM

## 2024-07-10 DIAGNOSIS — R05.1 ACUTE COUGH: ICD-10-CM

## 2024-07-10 PROCEDURE — 99214 OFFICE O/P EST MOD 30 MIN: CPT | Performed by: PHYSICIAN ASSISTANT

## 2024-07-10 PROCEDURE — 71046 X-RAY EXAM CHEST 2 VIEWS: CPT | Performed by: PHYSICIAN ASSISTANT

## 2024-07-10 RX ORDER — ALBUTEROL SULFATE 90 UG/1
2 AEROSOL, METERED RESPIRATORY (INHALATION) EVERY 6 HOURS PRN
Qty: 1 EACH | Refills: 0 | Status: SHIPPED | OUTPATIENT
Start: 2024-07-10 | End: 2024-08-09

## 2024-07-10 NOTE — PROGRESS NOTES
CHIEF COMPLAINT:     Chief Complaint   Patient presents with    Chest Congestion     Chest congestion, fatigue [id number].         HPI:   Rigo Solomon is a 68 year old male who presents for cold symptoms for 2 months.  No fever. No body aches/chills. No headache. Mild nasal congestion. No ear pain. No sore throat. Dry cough. Feels congestion stuck in the lungs. + chest tightness that comes and goes- worse after activity. + fatigue. No sharp chest pain. No SOB. No GI symptoms. Taking Coricidin OTC         Current Outpatient Medications   Medication Sig Dispense Refill    albuterol 108 (90 Base) MCG/ACT Inhalation Aero Soln Inhale 2 puffs into the lungs every 6 (six) hours as needed for Wheezing. 1 each 0    tamsulosin 0.4 MG Oral Cap Take 1 capsule (0.4 mg total) by mouth daily.      lisinopril 10 MG Oral Tab Take 1 tablet (10 mg total) by mouth daily. 90 tablet 1      Past Medical History:    Essential hypertension    Seasonal allergies      Social History:  Social History     Socioeconomic History    Marital status:    Tobacco Use    Smoking status: Never    Smokeless tobacco: Never   Vaping Use    Vaping status: Never Used   Substance and Sexual Activity    Alcohol use: No     Alcohol/week: 0.0 standard drinks of alcohol    Drug use: No    Sexual activity: Yes   Other Topics Concern    Caffeine Concern No    Exercise Yes     Comment: walking     Seat Belt Yes    Self-Exams No        REVIEW OF SYSTEMS:   GENERAL: No fever or chills.  SKIN: No rashes, or other skin lesions.   EYES: Denies blurred vision or double vision.  HENT: See HPI  CARDIOVASCULAR: Denies chest pain or palpitations  LUNGS: Per HPI.  GI: Denies N/V/C/D or abdominal pain.      EXAM:   /76 (BP Location: Left arm, Patient Position: Sitting, Cuff Size: adult)   Pulse 65   Temp 98.5 °F (36.9 °C) (Oral)   Resp 21   Ht 6' (1.829 m)   Wt 203 lb 4.8 oz (92.2 kg)   SpO2 99%   BMI 27.57 kg/m²   Physical Exam  Constitutional:        Appearance: Normal appearance.   HENT:      Head: Normocephalic.      Right Ear: Tympanic membrane, ear canal and external ear normal.      Left Ear: Tympanic membrane, ear canal and external ear normal.      Nose: Nose normal.      Mouth/Throat:      Mouth: Mucous membranes are moist.      Pharynx: Oropharynx is clear. No posterior oropharyngeal erythema.      Tonsils: No tonsillar exudate.   Eyes:      Conjunctiva/sclera: Conjunctivae normal.      Pupils: Pupils are equal, round, and reactive to light.   Cardiovascular:      Rate and Rhythm: Normal rate and regular rhythm.      Heart sounds: Normal heart sounds. No murmur heard.  Pulmonary:      Effort: Pulmonary effort is normal.      Breath sounds: Normal breath sounds.   Chest:      Chest wall: No tenderness.   Musculoskeletal:      Cervical back: Normal range of motion and neck supple.   Lymphadenopathy:      Cervical: No cervical adenopathy.   Skin:     General: Skin is warm.      Findings: No rash.   Neurological:      Mental Status: He is alert and oriented to person, place, and time.       No results found for this or any previous visit (from the past 24 hour(s)).       ASSESSMENT AND PLAN:     Rigo Solomon is a 68 year old male who presents with:     ASSESSMENT:  Encounter Diagnoses   Name Primary?    Acute cough Yes    Chest congestion      Chest xray negative   Trial of albuterol and cough medication as needed   Close PCP follow up if persists   ED for any chest pain or breathing issues     PLAN:  Meds as below.  Comfort Care as listed in Patient Instructions.      Meds & Refills for this Visit:  Requested Prescriptions     Signed Prescriptions Disp Refills    albuterol 108 (90 Base) MCG/ACT Inhalation Aero Soln 1 each 0     Sig: Inhale 2 puffs into the lungs every 6 (six) hours as needed for Wheezing.     Side effects, risks, benefits, of medication explained and discussed.    The patient indicates understanding of these issues and agrees to the  plan.      Patient Instructions   Albuterol inhaler every 4-6 hours as needed   Continue coricidin for cough as needed   Zyrtec OTC once daily for drainage   Rest   Fluids   Please follow up with PCP if no improvement or if symptoms worsen   ED for any chest pain or breathing issues

## 2024-07-10 NOTE — PATIENT INSTRUCTIONS
Albuterol inhaler every 4-6 hours as needed   Continue coricidin for cough as needed   Zyrtec OTC once daily for drainage   Rest   Fluids   Please follow up with PCP if no improvement or if symptoms worsen   ED for any chest pain or breathing issues

## 2024-09-17 ENCOUNTER — OFFICE VISIT (OUTPATIENT)
Dept: FAMILY MEDICINE CLINIC | Facility: CLINIC | Age: 69
End: 2024-09-17
Payer: MEDICARE

## 2024-09-17 VITALS
HEART RATE: 58 BPM | TEMPERATURE: 97 F | RESPIRATION RATE: 14 BRPM | WEIGHT: 208 LBS | HEIGHT: 72 IN | DIASTOLIC BLOOD PRESSURE: 70 MMHG | BODY MASS INDEX: 28.17 KG/M2 | SYSTOLIC BLOOD PRESSURE: 120 MMHG

## 2024-09-17 DIAGNOSIS — I10 ESSENTIAL HYPERTENSION: Primary | ICD-10-CM

## 2024-09-17 DIAGNOSIS — D58.2 ELEVATED HEMOGLOBIN (HCC): ICD-10-CM

## 2024-09-17 DIAGNOSIS — R73.9 HYPERGLYCEMIA: ICD-10-CM

## 2024-09-17 DIAGNOSIS — E78.5 DYSLIPIDEMIA: ICD-10-CM

## 2024-09-17 PROCEDURE — 99214 OFFICE O/P EST MOD 30 MIN: CPT | Performed by: FAMILY MEDICINE

## 2024-09-17 PROCEDURE — G2211 COMPLEX E/M VISIT ADD ON: HCPCS | Performed by: FAMILY MEDICINE

## 2024-09-17 RX ORDER — LISINOPRIL 10 MG/1
10 TABLET ORAL DAILY
Qty: 90 TABLET | Refills: 1 | Status: SHIPPED | OUTPATIENT
Start: 2024-09-17

## 2024-09-17 NOTE — PATIENT INSTRUCTIONS
Continue current meds.   Watch diet for fats and carbs.   Stay active.    Return for fasting blood work.  Further recommendation pending test results.  Refill policies:      Allow 3 business days for refills; controlled substances may take longer.  Contact your pharmacy at least 5-7 business days prior to running out of medication and have them send an electronic request or submit through the \"request refill\" option thru your [employer] account. No need to do both, as multiple requests will create an automated [employer] message to notify of a denial for one of the duplicated requests, causing you undue confusion.   Refills are NOT addressed on weekends; covering physicians do not authorize routine medications on weekends.  No narcotics or controlled substances are refilled after noon on Fridays or by on call physicians.  By law, narcotics cannot be faxed or phoned into your pharmacy.  If your prescription is due for a refill, you may be due for a follow up appointment. Please call our office at [phone] to make an appointment or schedule an appointment via [employer].  To best provide you care, patients receiving routine medications need to be seen at least twice a year. Patients receiving narcotic/controlled substance medications need to be seen at least once every 3 months.  In the event that your preferred pharmacy does not have the requested medication in stock (ie Backordered), it is your responsibility to find another pharmacy that has the requested medication available. We will gladly send a new prescription to that pharmacy at your request.  controlled substances may not be able to be filled out of state due to license restrictions.  If you have a planned trip, it's best to call your pharmacy at least 5-7 business days to prevent any delays in your medication refill.    Scheduling Tests:    If your physician has ordered radiology tests such as MRI or CT scans, please contact Central Scheduling at [phone]  right away to schedule the test.  Once scheduled, the [facility] Centralized Referral Team will work with your insurance carrier to obtain pre-certification or prior authorization.  Depending on your insurance carrier, approval may take 3-10 days.  It is highly recommended patients assure they have received an authorization before having a test performed.  If test is done without insurance authorization, patient may be responsible for the entire amount billed.      Precertification and Prior Authorizations:  If your physician has recommended that you have a procedure or additional testing performed the [facility] Centralized Referral Team will contact your insurance carrier to obtain pre-certification or prior authorization.    You are strongly encouraged to contact your insurance carrier to verify that your procedure/test has been approved and is a COVERED benefit.  Although the [facility] Centralized Referral Team does its due diligence, the insurance carrier gives the disclaimer that \"Although the procedure is authorized, this does not guarantee payment.\"    Ultimately the patient is responsible for payment.   Thank you for your understanding in this matter.  Paperwork Completion:  If you require FMLA or disability paperwork for your recovery, please make sure to either drop it off or have it faxed to our office at [phone]. Be sure the form has your name and date of birth on it.  The form will be faxed to our Forms Department and they will complete it for you.  There is a 25$ fee for all forms that need to be filled out.  Please be aware there is a 10-14 day turnaround time.  You will need to sign a release of information (THOMAS) form if your paperwork does not come with one.  You may call the Forms Department with any questions at [phone].  Their fax number is [phone].

## 2024-09-17 NOTE — PROGRESS NOTES
Rigo Solomon is a 68 year old male.cc hypertension, hyperglycemia, elevated PSA  HPI:   Patient is coming for follow-up of hypertension on lisinopril 10 mg daily patient is doing well with medication.  Will check his blood work to look for his electrolytes.  Denies any chest pain no shortness of breath no headaches    Hyperglycemia hemoglobin A1c 6.0 in the prediabetic range patient is trying to watch diet he will return for blood work for recommendation pending results.    Elevated PSA sees urologist Dr. Abad.  He had a biopsy which was unremarkable.  He needs to recheck the blood work next year with wellness visit.    Hyperlipidemia numbers were elevated trying to watch diet monitor.  Gained some weight recently.    Elevated hemoglobin level monitor recheck blood work.    Current Outpatient Medications   Medication Sig Dispense Refill    lisinopril 10 MG Oral Tab Take 1 tablet (10 mg total) by mouth daily. 90 tablet 1      Past Medical History:    Essential hypertension    Seasonal allergies      Social History:  Social History     Socioeconomic History    Marital status:    Tobacco Use    Smoking status: Never    Smokeless tobacco: Never   Vaping Use    Vaping status: Never Used   Substance and Sexual Activity    Alcohol use: No     Alcohol/week: 0.0 standard drinks of alcohol    Drug use: No    Sexual activity: Yes   Other Topics Concern    Caffeine Concern No    Exercise Yes     Comment: walking     Seat Belt Yes    Self-Exams No        REVIEW OF SYSTEMS:   GENERAL HEALTH: feels well otherwise  SKIN: denies any unusual skin lesions or rashes  HEENT no congestion no runny nose no sore throat  Neck no neck pain  RESPIRATORY: denies shortness of breath with exertion,  CARDIOVASCULAR: denies chest pain on exertion  GI: denies abdominal pain and denies heartburn  NEURO: denies headaches  Musculoskeletal left elbow pain  Psych normal mood.    EXAM:   /70 (BP Location: Right arm, Patient Position:  Sitting, Cuff Size: adult)   Pulse 58   Temp 96.9 °F (36.1 °C) (Temporal)   Resp 14   Ht 6' (1.829 m)   Wt 208 lb (94.3 kg)   BMI 28.21 kg/m²   GENERAL: well developed, well nourished,in no apparent distress  SKIN: no rashes,no suspicious lesions  HEENT: atraumatic, normocephalic,ears and throat are clear  NECK: supple,no adenopathy,  LUNGS: clear to auscultation  CARDIO: RRR without murmur  GI: good BS's,no masses, HSM or tenderness  EXTREMITIES: no edema  Musculoskeletal tenderness left elbow  Psychiatric - alert  and oriented x3, normal mood     Results for orders placed or performed in visit on 02/23/24   PSA, Total - Diagnostic [E]   Result Value Ref Range    PSA 15.10 (H) <=4.00 ng/mL   Comp Metabolic Panel (14)   Result Value Ref Range    Glucose 98 70 - 99 mg/dL    Sodium 139 136 - 145 mmol/L    Potassium 4.2 3.5 - 5.1 mmol/L    Chloride 108 98 - 112 mmol/L    CO2 26.0 21.0 - 32.0 mmol/L    Anion Gap 5 0 - 18 mmol/L    BUN 17 9 - 23 mg/dL    Creatinine 1.11 0.70 - 1.30 mg/dL    Calcium, Total 9.9 8.5 - 10.1 mg/dL    Calculated Osmolality 290 275 - 295 mOsm/kg    eGFR-Cr 71 >=60 mL/min/1.73m2    AST 28 15 - 37 U/L    ALT 35 16 - 61 U/L    Alkaline Phosphatase 79 45 - 117 U/L    Bilirubin, Total 0.9 0.1 - 2.0 mg/dL    Total Protein 8.0 6.4 - 8.2 g/dL    Albumin 4.1 3.4 - 5.0 g/dL    Globulin  3.9 2.8 - 4.4 g/dL    A/G Ratio 1.1 1.0 - 2.0    Patient Fasting for CMP? Yes    Lipid Panel   Result Value Ref Range    Cholesterol, Total 175 <200 mg/dL    HDL Cholesterol 41 40 - 59 mg/dL    Triglycerides 84 30 - 149 mg/dL    LDL Cholesterol 118 (H) <100 mg/dL    VLDL 15 0 - 30 mg/dL    Non HDL Chol 134 (H) <130 mg/dL    Patient Fasting for Lipid? Yes    Urinalysis with Culture Reflex    Specimen: Urine, clean catch   Result Value Ref Range    Urine Color Yellow Yellow    Clarity Urine Clear Clear    Spec Gravity 1.021 1.005 - 1.030    Glucose Urine Normal Normal mg/dL    Bilirubin Urine Negative Negative     Ketones Urine Negative Negative mg/dL    Blood Urine Negative Negative    pH Urine 5.0 5.0 - 8.0    Protein Urine Negative Negative mg/dL    Urobilinogen Urine Normal Normal mg/dL    Nitrite Urine Negative Negative    Leukocyte Esterase Urine Negative Negative    Microscopic Microscopic not indicated    TSH W Reflex To Free T4   Result Value Ref Range    TSH 1.140 0.358 - 3.740 mIU/mL   Hemoglobin A1C   Result Value Ref Range    HgbA1C 6.0 (H) <5.7 %    Estimated Average Glucose 126 68 - 126 mg/dL   CBC W/ DIFFERENTIAL   Result Value Ref Range    WBC 6.4 4.0 - 11.0 x10(3) uL    RBC 5.81 (H) 3.80 - 5.80 x10(6)uL    HGB 17.9 (H) 13.0 - 17.5 g/dL    HCT 53.3 (H) 39.0 - 53.0 %    .0 150.0 - 450.0 10(3)uL    MCV 91.7 80.0 - 100.0 fL    MCH 30.8 26.0 - 34.0 pg    MCHC 33.6 31.0 - 37.0 g/dL    RDW 13.2 %    Neutrophil Absolute Prelim 4.16 1.50 - 7.70 x10 (3) uL    Neutrophil Absolute 4.16 1.50 - 7.70 x10(3) uL    Lymphocyte Absolute 1.61 1.00 - 4.00 x10(3) uL    Monocyte Absolute 0.53 0.10 - 1.00 x10(3) uL    Eosinophil Absolute 0.03 0.00 - 0.70 x10(3) uL    Basophil Absolute 0.03 0.00 - 0.20 x10(3) uL    Immature Granulocyte Absolute 0.01 0.00 - 1.00 x10(3) uL    Neutrophil % 65.2 %    Lymphocyte % 25.3 %    Monocyte % 8.3 %    Eosinophil % 0.5 %    Basophil % 0.5 %    Immature Granulocyte % 0.2 %      ASSESSMENT AND PLAN:     Encounter Diagnoses   Name Primary?    Essential hypertension Yes    Hyperglycemia     Dyslipidemia     Elevated hemoglobin (HCC)        Orders Placed This Encounter   Procedures    Lipid Panel    Hemoglobin A1C    Comp Metabolic Panel (14)    CBC W Differential W Platelet [E]       Meds & Refills for this Visit:  Requested Prescriptions     Signed Prescriptions Disp Refills    lisinopril 10 MG Oral Tab 90 tablet 1     Sig: Take 1 tablet (10 mg total) by mouth daily.   Continue current meds.   Watch diet for fats and carbs.   Stay active.    Return for fasting blood work.  Further  recommendation pending test results.    Imaging & Consults:  None    The patient indicates understanding of these issues and agrees to the plan.  The patient is asked to return in Feb 2025 for Medicare wellness visit.  The note was dictated using speech recognition software.  Accuracy and grammar in transcription may be subject to error.

## 2024-09-24 ENCOUNTER — LAB ENCOUNTER (OUTPATIENT)
Dept: LAB | Age: 69
End: 2024-09-24
Attending: FAMILY MEDICINE
Payer: MEDICARE

## 2024-09-24 DIAGNOSIS — D58.2 ELEVATED HEMOGLOBIN (HCC): ICD-10-CM

## 2024-09-24 DIAGNOSIS — R73.9 HYPERGLYCEMIA: ICD-10-CM

## 2024-09-24 DIAGNOSIS — E78.5 DYSLIPIDEMIA: ICD-10-CM

## 2024-09-24 LAB
ALBUMIN SERPL-MCNC: 4.3 G/DL (ref 3.2–4.8)
ALBUMIN/GLOB SERPL: 1.3 {RATIO} (ref 1–2)
ALP LIVER SERPL-CCNC: 79 U/L
ALT SERPL-CCNC: 32 U/L
ANION GAP SERPL CALC-SCNC: 6 MMOL/L (ref 0–18)
AST SERPL-CCNC: 28 U/L (ref ?–34)
BASOPHILS # BLD AUTO: 0.02 X10(3) UL (ref 0–0.2)
BASOPHILS NFR BLD AUTO: 0.3 %
BILIRUB SERPL-MCNC: 1 MG/DL (ref 0.2–1.1)
BUN BLD-MCNC: 15 MG/DL (ref 9–23)
CALCIUM BLD-MCNC: 9.7 MG/DL (ref 8.7–10.4)
CHLORIDE SERPL-SCNC: 108 MMOL/L (ref 98–112)
CHOLEST SERPL-MCNC: 159 MG/DL (ref ?–200)
CO2 SERPL-SCNC: 25 MMOL/L (ref 21–32)
CREAT BLD-MCNC: 1.05 MG/DL
EGFRCR SERPLBLD CKD-EPI 2021: 76 ML/MIN/1.73M2 (ref 60–?)
EOSINOPHIL # BLD AUTO: 0.07 X10(3) UL (ref 0–0.7)
EOSINOPHIL NFR BLD AUTO: 1.2 %
ERYTHROCYTE [DISTWIDTH] IN BLOOD BY AUTOMATED COUNT: 13.5 %
EST. AVERAGE GLUCOSE BLD GHB EST-MCNC: 114 MG/DL (ref 68–126)
FASTING PATIENT LIPID ANSWER: YES
FASTING STATUS PATIENT QL REPORTED: YES
GLOBULIN PLAS-MCNC: 3.3 G/DL (ref 2–3.5)
GLUCOSE BLD-MCNC: 78 MG/DL (ref 70–99)
HBA1C MFR BLD: 5.6 % (ref ?–5.7)
HCT VFR BLD AUTO: 51.8 %
HDLC SERPL-MCNC: 38 MG/DL (ref 40–59)
HGB BLD-MCNC: 17.2 G/DL
IMM GRANULOCYTES # BLD AUTO: 0.01 X10(3) UL (ref 0–1)
IMM GRANULOCYTES NFR BLD: 0.2 %
LDLC SERPL CALC-MCNC: 108 MG/DL (ref ?–100)
LYMPHOCYTES # BLD AUTO: 1.79 X10(3) UL (ref 1–4)
LYMPHOCYTES NFR BLD AUTO: 29.8 %
MCH RBC QN AUTO: 30.4 PG (ref 26–34)
MCHC RBC AUTO-ENTMCNC: 33.2 G/DL (ref 31–37)
MCV RBC AUTO: 91.7 FL
MONOCYTES # BLD AUTO: 0.61 X10(3) UL (ref 0.1–1)
MONOCYTES NFR BLD AUTO: 10.1 %
NEUTROPHILS # BLD AUTO: 3.51 X10 (3) UL (ref 1.5–7.7)
NEUTROPHILS # BLD AUTO: 3.51 X10(3) UL (ref 1.5–7.7)
NEUTROPHILS NFR BLD AUTO: 58.4 %
NONHDLC SERPL-MCNC: 121 MG/DL (ref ?–130)
OSMOLALITY SERPL CALC.SUM OF ELEC: 288 MOSM/KG (ref 275–295)
PLATELET # BLD AUTO: 188 10(3)UL (ref 150–450)
POTASSIUM SERPL-SCNC: 3.9 MMOL/L (ref 3.5–5.1)
PROT SERPL-MCNC: 7.6 G/DL (ref 5.7–8.2)
RBC # BLD AUTO: 5.65 X10(6)UL
SODIUM SERPL-SCNC: 139 MMOL/L (ref 136–145)
TRIGL SERPL-MCNC: 66 MG/DL (ref 30–149)
VLDLC SERPL CALC-MCNC: 11 MG/DL (ref 0–30)
WBC # BLD AUTO: 6 X10(3) UL (ref 4–11)

## 2024-09-24 PROCEDURE — 85025 COMPLETE CBC W/AUTO DIFF WBC: CPT

## 2024-09-24 PROCEDURE — 80061 LIPID PANEL: CPT

## 2024-09-24 PROCEDURE — 83036 HEMOGLOBIN GLYCOSYLATED A1C: CPT

## 2024-09-24 PROCEDURE — 80053 COMPREHEN METABOLIC PANEL: CPT

## 2025-02-05 ENCOUNTER — MED REC SCAN ONLY (OUTPATIENT)
Dept: FAMILY MEDICINE CLINIC | Facility: CLINIC | Age: 70
End: 2025-02-05

## 2025-02-26 ENCOUNTER — OFFICE VISIT (OUTPATIENT)
Dept: FAMILY MEDICINE CLINIC | Facility: CLINIC | Age: 70
End: 2025-02-26
Payer: MEDICARE

## 2025-02-26 ENCOUNTER — LAB ENCOUNTER (OUTPATIENT)
Dept: LAB | Age: 70
End: 2025-02-26
Attending: FAMILY MEDICINE
Payer: MEDICARE

## 2025-02-26 VITALS
WEIGHT: 207 LBS | SYSTOLIC BLOOD PRESSURE: 120 MMHG | HEART RATE: 54 BPM | HEIGHT: 72 IN | BODY MASS INDEX: 28.04 KG/M2 | RESPIRATION RATE: 16 BRPM | DIASTOLIC BLOOD PRESSURE: 82 MMHG | TEMPERATURE: 97 F

## 2025-02-26 DIAGNOSIS — R73.9 HYPERGLYCEMIA: ICD-10-CM

## 2025-02-26 DIAGNOSIS — I10 ESSENTIAL HYPERTENSION: ICD-10-CM

## 2025-02-26 DIAGNOSIS — E78.5 DYSLIPIDEMIA: ICD-10-CM

## 2025-02-26 DIAGNOSIS — R97.20 ELEVATED PSA MEASUREMENT: ICD-10-CM

## 2025-02-26 DIAGNOSIS — Z00.00 ENCOUNTER FOR ANNUAL HEALTH EXAMINATION: Primary | ICD-10-CM

## 2025-02-26 DIAGNOSIS — N40.0 BENIGN PROSTATIC HYPERPLASIA WITHOUT LOWER URINARY TRACT SYMPTOMS: ICD-10-CM

## 2025-02-26 DIAGNOSIS — R97.20 ELEVATED PSA: ICD-10-CM

## 2025-02-26 LAB
ALBUMIN SERPL-MCNC: 5.2 G/DL (ref 3.2–4.8)
ALBUMIN/GLOB SERPL: 1.5 {RATIO} (ref 1–2)
ALP LIVER SERPL-CCNC: 88 U/L
ALT SERPL-CCNC: 30 U/L
ANION GAP SERPL CALC-SCNC: 3 MMOL/L (ref 0–18)
AST SERPL-CCNC: 34 U/L (ref ?–34)
BASOPHILS # BLD AUTO: 0.02 X10(3) UL (ref 0–0.2)
BASOPHILS NFR BLD AUTO: 0.4 %
BILIRUB SERPL-MCNC: 1.3 MG/DL (ref 0.2–1.1)
BILIRUB UR QL STRIP.AUTO: NEGATIVE
BUN BLD-MCNC: 12 MG/DL (ref 9–23)
CALCIUM BLD-MCNC: 9.8 MG/DL (ref 8.7–10.6)
CHLORIDE SERPL-SCNC: 104 MMOL/L (ref 98–112)
CHOLEST SERPL-MCNC: 192 MG/DL (ref ?–200)
CLARITY UR REFRACT.AUTO: CLEAR
CO2 SERPL-SCNC: 28 MMOL/L (ref 21–32)
CREAT BLD-MCNC: 1.18 MG/DL
EGFRCR SERPLBLD CKD-EPI 2021: 66 ML/MIN/1.73M2 (ref 60–?)
EOSINOPHIL # BLD AUTO: 0.08 X10(3) UL (ref 0–0.7)
EOSINOPHIL NFR BLD AUTO: 1.4 %
ERYTHROCYTE [DISTWIDTH] IN BLOOD BY AUTOMATED COUNT: 13.2 %
EST. AVERAGE GLUCOSE BLD GHB EST-MCNC: 120 MG/DL (ref 68–126)
FASTING PATIENT LIPID ANSWER: YES
FASTING STATUS PATIENT QL REPORTED: YES
GLOBULIN PLAS-MCNC: 3.4 G/DL (ref 2–3.5)
GLUCOSE BLD-MCNC: 81 MG/DL (ref 70–99)
GLUCOSE UR STRIP.AUTO-MCNC: NORMAL MG/DL
HBA1C MFR BLD: 5.8 % (ref ?–5.7)
HCT VFR BLD AUTO: 57.9 %
HDLC SERPL-MCNC: 41 MG/DL (ref 40–59)
HGB BLD-MCNC: 19.2 G/DL
IMM GRANULOCYTES # BLD AUTO: 0.01 X10(3) UL (ref 0–1)
IMM GRANULOCYTES NFR BLD: 0.2 %
KETONES UR STRIP.AUTO-MCNC: NEGATIVE MG/DL
LDLC SERPL CALC-MCNC: 132 MG/DL (ref ?–100)
LEUKOCYTE ESTERASE UR QL STRIP.AUTO: NEGATIVE
LYMPHOCYTES # BLD AUTO: 1.83 X10(3) UL (ref 1–4)
LYMPHOCYTES NFR BLD AUTO: 32.3 %
MCH RBC QN AUTO: 30.7 PG (ref 26–34)
MCHC RBC AUTO-ENTMCNC: 33.2 G/DL (ref 31–37)
MCV RBC AUTO: 92.5 FL
MONOCYTES # BLD AUTO: 0.46 X10(3) UL (ref 0.1–1)
MONOCYTES NFR BLD AUTO: 8.1 %
NEUTROPHILS # BLD AUTO: 3.26 X10 (3) UL (ref 1.5–7.7)
NEUTROPHILS # BLD AUTO: 3.26 X10(3) UL (ref 1.5–7.7)
NEUTROPHILS NFR BLD AUTO: 57.6 %
NITRITE UR QL STRIP.AUTO: NEGATIVE
NONHDLC SERPL-MCNC: 151 MG/DL (ref ?–130)
OSMOLALITY SERPL CALC.SUM OF ELEC: 279 MOSM/KG (ref 275–295)
PH UR STRIP.AUTO: 6.5 [PH] (ref 5–8)
PLATELET # BLD AUTO: 189 10(3)UL (ref 150–450)
POTASSIUM SERPL-SCNC: 4 MMOL/L (ref 3.5–5.1)
PROT SERPL-MCNC: 8.6 G/DL (ref 5.7–8.2)
PROT UR STRIP.AUTO-MCNC: NEGATIVE MG/DL
PSA SERPL-MCNC: 11.9 NG/ML (ref ?–4)
RBC # BLD AUTO: 6.26 X10(6)UL
RBC UR QL AUTO: NEGATIVE
SODIUM SERPL-SCNC: 135 MMOL/L (ref 136–145)
SP GR UR STRIP.AUTO: 1.02 (ref 1–1.03)
TRIGL SERPL-MCNC: 106 MG/DL (ref 30–149)
TSI SER-ACNC: 1.61 UIU/ML (ref 0.55–4.78)
UROBILINOGEN UR STRIP.AUTO-MCNC: NORMAL MG/DL
VLDLC SERPL CALC-MCNC: 19 MG/DL (ref 0–30)
WBC # BLD AUTO: 5.7 X10(3) UL (ref 4–11)

## 2025-02-26 PROCEDURE — G0439 PPPS, SUBSEQ VISIT: HCPCS | Performed by: FAMILY MEDICINE

## 2025-02-26 PROCEDURE — 85025 COMPLETE CBC W/AUTO DIFF WBC: CPT

## 2025-02-26 PROCEDURE — 84443 ASSAY THYROID STIM HORMONE: CPT

## 2025-02-26 PROCEDURE — 80053 COMPREHEN METABOLIC PANEL: CPT

## 2025-02-26 PROCEDURE — 84153 ASSAY OF PSA TOTAL: CPT

## 2025-02-26 PROCEDURE — 36415 COLL VENOUS BLD VENIPUNCTURE: CPT

## 2025-02-26 PROCEDURE — 80061 LIPID PANEL: CPT

## 2025-02-26 PROCEDURE — 81003 URINALYSIS AUTO W/O SCOPE: CPT

## 2025-02-26 PROCEDURE — 99214 OFFICE O/P EST MOD 30 MIN: CPT | Performed by: FAMILY MEDICINE

## 2025-02-26 PROCEDURE — 83036 HEMOGLOBIN GLYCOSYLATED A1C: CPT

## 2025-02-26 PROCEDURE — G2211 COMPLEX E/M VISIT ADD ON: HCPCS | Performed by: FAMILY MEDICINE

## 2025-02-26 RX ORDER — LISINOPRIL 10 MG/1
10 TABLET ORAL DAILY
Qty: 90 TABLET | Refills: 1 | Status: SHIPPED | OUTPATIENT
Start: 2025-02-26

## 2025-02-26 NOTE — PATIENT INSTRUCTIONS
Continue current meds.   Watch diet for fats and carbs.   Stay active.    Do fasting blood work.  Further recommendation pending test results.    Follow-up with Dr. Abad as scheduled.        Refill policies:      Allow 3 business days for refills; controlled substances may take longer.  Contact your pharmacy at least 5-7 business days prior to running out of medication and have them send an electronic request or submit through the \"request refill\" option thru your [employer] account. No need to do both, as multiple requests will create an automated [employer] message to notify of a denial for one of the duplicated requests, causing you undue confusion.   Refills are NOT addressed on weekends; covering physicians do not authorize routine medications on weekends.  No narcotics or controlled substances are refilled after noon on Fridays or by on call physicians.  By law, narcotics cannot be faxed or phoned into your pharmacy.  If your prescription is due for a refill, you may be due for a follow up appointment. Please call our office at [phone] to make an appointment or schedule an appointment via [employer].  To best provide you care, patients receiving routine medications need to be seen at least twice a year. Patients receiving narcotic/controlled substance medications need to be seen at least once every 3 months.  In the event that your preferred pharmacy does not have the requested medication in stock (ie Backordered), it is your responsibility to find another pharmacy that has the requested medication available. We will gladly send a new prescription to that pharmacy at your request.  controlled substances may not be able to be filled out of state due to license restrictions.  If you have a planned trip, it's best to call your pharmacy at least 5-7 business days to prevent any delays in your medication refill.    Scheduling Tests:    If your physician has ordered radiology tests such as MRI or CT scans, please  contact Central Scheduling at [phone] right away to schedule the test.  Once scheduled, the [facility] Centralized Referral Team will work with your insurance carrier to obtain pre-certification or prior authorization.  Depending on your insurance carrier, approval may take 3-10 days.  It is highly recommended patients assure they have received an authorization before having a test performed.  If test is done without insurance authorization, patient may be responsible for the entire amount billed.      Precertification and Prior Authorizations:  If your physician has recommended that you have a procedure or additional testing performed the [facility] Centralized Referral Team will contact your insurance carrier to obtain pre-certification or prior authorization.    You are strongly encouraged to contact your insurance carrier to verify that your procedure/test has been approved and is a COVERED benefit.  Although the [facility] Centralized Referral Team does its due diligence, the insurance carrier gives the disclaimer that \"Although the procedure is authorized, this does not guarantee payment.\"    Ultimately the patient is responsible for payment.   Thank you for your understanding in this matter.  Paperwork Completion:  If you require FMLA or disability paperwork for your recovery, please make sure to either drop it off or have it faxed to our office at [phone]. Be sure the form has your name and date of birth on it.  The form will be faxed to our Forms Department and they will complete it for you.  There is a 25$ fee for all forms that need to be filled out.  Please be aware there is a 10-14 day turnaround time.  You will need to sign a release of information (THOMAS) form if your paperwork does not come with one.  You may call the Forms Department with any questions at [phone].  Their fax number is [phone].

## 2025-02-26 NOTE — PROGRESS NOTES
Subjective:   Rigo Solomon is a 69 year old male who presents for a Medicare Subsequent Annual Wellness visit (Pt already had Initial Annual Wellness).  Also follow-up on medical conditions  hypertension, hyperglycemia, low HDL.    Hypertension blood pressure stable on lisinopril 10 mg daily.  He is doing well with medication.  Patient denies any side effects.  No chest pain, no shortness of breath, no palpitations ,no headaches.     Prostate enlargement and elevated PSA.  patient was seen urologist seeing Dr. Abad.  Had MRI done which showed just enlarged prostate.  Patient had biopsy done which came back unremarkable.  Patient would like to have a PSA ordered today and results forwarded to his urologist.     Hyperglycemia sugar was elevated-requires close monitoring.  Patient is watching diet discussed low-carb diet again.  Further recommendation pending blood work results.     Low HDL we will check blood work next year.  Watching diet.  We will monitor blood work.  He is to be monitored.    Patient has  stress related to his wife who has MS and some memory problems worrying about her.  Right now stable.     Patient elevated hemoglobin level of the blood work monitor.    History/Other:   Fall Risk Assessment:   He has been screened for Falls and is High Risk. Fall Prevention information provided to patient in After Visit Summary.    Do you feel unsteady when standing or walking?: No  Do you worry about falling?: No  Have you fallen in the past year?: Yes  How many times have you fallen?: 1  Were you injured?:  (Fracture radius)     Cognitive Assessment:   He had a completely normal cognitive assessment - see flowsheet entries       Functional Ability/Status:   Rigo Solomon has a completely normal functional assessment. See flowsheet for details.        Depression Screening (PHQ-2/PHQ-9): PHQ-2 SCORE: 0  , done 2/26/2025          Advanced Directives:   He does NOT have a Living Will. [Do you have a living  will?: No]  He does have a POA but we do NOT have it on file in Epic.      Discussed Advance Care Planning with patient (and family/surrogate if present). Standard forms made available to patient in After Visit Summary.      Patient Active Problem List   Diagnosis    Lateral epicondylitis    Tendinitis of left rotator cuff    Family history of Parkinson's disease    Essential hypertension    Hyperglycemia    Dyslipidemia    Elevated hemoglobin    Squamous cell carcinoma of skin of lower extremity    Basal cell carcinoma (BCC) of right lower leg    Personal history of malignant neoplasm of skin    Discoloration of skin of toe    Elevated PSA measurement     Allergies:  He is allergic to naprosyn [naproxen], cefdinir, and other.    Current Medications:  Outpatient Medications Marked as Taking for the 2/26/25 encounter (Office Visit) with Kaylin Lyon MD   Medication Sig    lisinopril 10 MG Oral Tab Take 1 tablet (10 mg total) by mouth daily.       Medical History:  He  has a past medical history of Essential hypertension and Seasonal allergies.  Surgical History:  He  has a past surgical history that includes anal sphincterotomy; oral surgery procedure (01/01/1990); vasectomy (01/01/1985); tonsillectomy (01/01/1966); other surgical history (08/01/2015); hemorrhoidectomy (01/01/2008); colonoscopy (01/01/2008); and biopsy of prostate,incisional.   Family History:  His family history includes Breast Cancer in an other family member; Cancer in his maternal grandfather and sister; GERD in his brother; Heart Disease in his paternal grandfather; Hypertension in his sister; Other in his paternal grandfather and sister; Parkinsons in his mother; parkinsons in his maternal grandmother.  Social History:  He  reports that he has never smoked. He has never used smokeless tobacco. He reports that he does not drink alcohol and does not use drugs.    Tobacco:  He has never smoked tobacco.    CAGE Alcohol Screen:   CAGE  screening score of 0 on 2/26/2025, showing low risk of alcohol abuse.      Patient Care Team:  Kaylin Lyon MD as PCP - General (Family Medicine)    Review of Systems  GENERAL: feels well otherwise  SKIN: denies any unusual skin lesions  EYES: denies blurred vision or double vision  HEENT: denies nasal congestion, sinus pain or ST  LUNGS: denies shortness of breath with exertion  CARDIOVASCULAR: denies chest pain on exertion  GI: denies abdominal pain, denies heartburn  : 1 per night nocturia, no complaint of urinary incontinence  MUSCULOSKELETAL: denies back pain  NEURO: denies headaches  PSYCHE: denies depression or anxiety  HEMATOLOGIC: denies hx of anemia  ENDOCRINE: denies thyroid history  ALL/ASTHMA: denies hx of allergy or asthma    Objective:   Physical Exam  General Appearance:  Alert, cooperative, no distress, appears stated age   Head:  Normocephalic, without obvious abnormality, atraumatic   Eyes:  PERRL, conjunctiva/corneas clear, EOM's intact, both eyes   Ears:  Normal TM's and external ear canals, both ears   Nose: Nares normal, septum midline, mucosa normal, no drainage or sinus tenderness   Throat: Lips, mucosa, and tongue normal; teeth and gums normal   Neck: Supple, symmetrical, trachea midline, no adenopathy, thyroid: not enlarged, symmetric, no tenderness/mass/nodules,    Back:   Symmetric, no curvature, ROM normal, no CVA tenderness   Lungs:   Clear to auscultation bilaterally, respirations unlabored   Chest Wall:  No tenderness or deformity   Heart:  Regular rate and rhythm, S1, S2 normal, no murmur, rub or gallop   Abdomen:   Soft, non-tender, bowel sounds active all four quadrants,  no masses, no organomegaly   Genitalia:  By urologist   Rectal:  Done by urologist   Extremities: Extremities normal, atraumatic, no cyanosis or edema   Pulses: 2+ and symmetric   Skin: Skin color, texture, turgor normal, no rashes or lesions   Lymph nodes: Cervical, supraclavicular, and axillary nodes  normal   Neurologic: Normal         /82 (BP Location: Left arm, Patient Position: Sitting, Cuff Size: adult)   Pulse 54   Temp 96.9 °F (36.1 °C) (Temporal)   Resp 16   Ht 6' (1.829 m)   Wt 207 lb (93.9 kg)   BMI 28.07 kg/m²  Estimated body mass index is 28.07 kg/m² as calculated from the following:    Height as of this encounter: 6' (1.829 m).    Weight as of this encounter: 207 lb (93.9 kg).    Medicare Hearing Assessment:   Hearing Screening    Time taken: 2/26/2025 10:25 AM  Screening Method: Finger Rub  Finger Rub Result: Pass         Results for orders placed or performed in visit on 09/24/24   Comp Metabolic Panel (14)    Collection Time: 09/24/24  7:54 AM   Result Value Ref Range    Glucose 78 70 - 99 mg/dL    Sodium 139 136 - 145 mmol/L    Potassium 3.9 3.5 - 5.1 mmol/L    Chloride 108 98 - 112 mmol/L    CO2 25.0 21.0 - 32.0 mmol/L    Anion Gap 6 0 - 18 mmol/L    BUN 15 9 - 23 mg/dL    Creatinine 1.05 0.70 - 1.30 mg/dL    Calcium, Total 9.7 8.7 - 10.4 mg/dL    Calculated Osmolality 288 275 - 295 mOsm/kg    eGFR-Cr 76 >=60 mL/min/1.73m2    AST 28 <34 U/L    ALT 32 10 - 49 U/L    Alkaline Phosphatase 79 45 - 117 U/L    Bilirubin, Total 1.0 0.2 - 1.1 mg/dL    Total Protein 7.6 5.7 - 8.2 g/dL    Albumin 4.3 3.2 - 4.8 g/dL    Globulin  3.3 2.0 - 3.5 g/dL    A/G Ratio 1.3 1.0 - 2.0    Patient Fasting for CMP? Yes    Lipid Panel    Collection Time: 09/24/24  7:54 AM   Result Value Ref Range    Cholesterol, Total 159 <200 mg/dL    HDL Cholesterol 38 (L) 40 - 59 mg/dL    Triglycerides 66 30 - 149 mg/dL    LDL Cholesterol 108 (H) <100 mg/dL    VLDL 11 0 - 30 mg/dL    Non HDL Chol 121 <130 mg/dL    Patient Fasting for Lipid? Yes    Hemoglobin A1C    Collection Time: 09/24/24  7:54 AM   Result Value Ref Range    HgbA1C 5.6 <5.7 %    Estimated Average Glucose 114 68 - 126 mg/dL   CBC W Differential W Platelet [E]    Collection Time: 09/24/24  7:54 AM   Result Value Ref Range    WBC 6.0 4.0 - 11.0 x10(3) uL     RBC 5.65 3.80 - 5.80 x10(6)uL    HGB 17.2 13.0 - 17.5 g/dL    HCT 51.8 39.0 - 53.0 %    .0 150.0 - 450.0 10(3)uL    MCV 91.7 80.0 - 100.0 fL    MCH 30.4 26.0 - 34.0 pg    MCHC 33.2 31.0 - 37.0 g/dL    RDW 13.5 %    Neutrophil Absolute Prelim 3.51 1.50 - 7.70 x10 (3) uL    Neutrophil Absolute 3.51 1.50 - 7.70 x10(3) uL    Lymphocyte Absolute 1.79 1.00 - 4.00 x10(3) uL    Monocyte Absolute 0.61 0.10 - 1.00 x10(3) uL    Eosinophil Absolute 0.07 0.00 - 0.70 x10(3) uL    Basophil Absolute 0.02 0.00 - 0.20 x10(3) uL    Immature Granulocyte Absolute 0.01 0.00 - 1.00 x10(3) uL    Neutrophil % 58.4 %    Lymphocyte % 29.8 %    Monocyte % 10.1 %    Eosinophil % 1.2 %    Basophil % 0.3 %    Immature Granulocyte % 0.2 %     Assessment & Plan:   Rigo Solomon is a 69 year old male who presents for a Medicare Assessment.     1. Encounter for annual health examination (Primary)  2. Dyslipidemia  -     CBC With Differential With Platelet; Future; Expected date: 02/26/2025  -     Comp Metabolic Panel (14); Future; Expected date: 02/26/2025  -     Urinalysis with Culture Reflex; Future; Expected date: 02/26/2025  -     TSH W Reflex To Free T4; Future; Expected date: 02/26/2025  -     Lipid Panel; Future; Expected date: 02/26/2025  3. Hyperglycemia  -     Hemoglobin A1C; Future; Expected date: 02/26/2025  4. Essential hypertension  -     Lisinopril; Take 1 tablet (10 mg total) by mouth daily.  Dispense: 90 tablet; Refill: 1  5. Elevated PSA  6. Benign prostatic hyperplasia without lower urinary tract symptoms  Other orders  -     PSA Total, Diagnostic; Future; Expected date: 02/26/2025    Elevated PSA patient seeing urologist,  blood test done today shows elevated PSA see urologist.      BPH - doing well asymptomatic seeing urologist prostate biopsy was completed came back unremarkable monitor.    Hyperglycemia hemoglobin A1c 6.0 done todaywatch diet monitor.  Recheck before next visit.     Dyslipidemia low HDL monitor.  LDL  came back elevated this time monitor.    Hypertension on lisinopril continue current medication monitor blood work    Elevated hemoglobin level  in the past monitor    Depression screening reviewed.  Time spent 8 minutes,  worrying about his wife.  Patient has supportive family.    Continue current meds.   Watch diet for fats and carbs.   Stay active.    Schedule physical therapy.  Call office after physical therapy if not better.    The patient indicates understanding of these issues and agrees to the plan.  Lab work ordered.  Reinforced healthy diet, lifestyle, and exercise.      No follow-ups on file.     Kaylin Lyon MD, 2/22/2023     Supplementary Documentation:   General Health:  In the past six months, have you lost more than 10 pounds without trying?: 2 - No  Has your appetite been poor?: No  Type of Diet: Balanced  How does the patient maintain a good energy level?: Appropriate Exercise;Daily Walks;Stretching  How would you describe your daily physical activity?: Moderate  How would you describe your current health state?: Good  How do you maintain positive mental well-being?: Visiting Friends;Games;Puzzles;Social Interaction;Visiting Family  On a scale of 0 to 10, with 0 being no pain and 10 being severe pain, what is your pain level?: 0 - (None)  In the past six months, have you experienced urine leakage?: 0-No  At any time do you feel concerned for the safety/well-being of yourself and/or your children, in your home or elsewhere?: No  Have you had any immunizations at another office such as Influenza, Hepatitis B, Tetanus, or Pneumococcal?: No        Rigo Solomon's SCREENING SCHEDULE   Tests on this list are recommended by your physician but may not be covered, or covered at this frequency, by your insurer.   Please check with your insurance carrier before scheduling to verify coverage.   PREVENTATIVE SERVICES FREQUENCY &  COVERAGE DETAILS LAST COMPLETION DATE   Diabetes Screening    Fasting  Blood Sugar / Glucose    One screening every 12 months if never tested or if previously tested but not diagnosed with pre-diabetes   One screening every 6 months if diagnosed with pre-diabetes Lab Results   Component Value Date    GLU 78 09/24/2024        Cardiovascular Disease Screening    Lipid Panel  Cholesterol  Lipoprotein (HDL)  Triglycerides Covered every 5 years for all Medicare beneficiaries without apparent signs or symptoms of cardiovascular disease Lab Results   Component Value Date    CHOLEST 159 09/24/2024    HDL 38 (L) 09/24/2024     (H) 09/24/2024    TRIG 66 09/24/2024         Electrocardiogram (EKG)   Covered if needed at Welcome to Medicare, and non-screening if indicated for medical reasons 06/23/2021      Ultrasound Screening for Abdominal Aortic Aneurysm (AAA) Covered once in a lifetime for one of the following risk factors    Men who are 65-75 years old and have ever smoked    Anyone with a family history -     Colorectal Cancer Screening  Covered for ages 50-85; only need ONE of the following:    Colonoscopy   Covered every 10 years    Covered every 2 years if patient is at high risk or previous colonoscopy was abnormal 09/04/2018    Health Maintenance   Topic Date Due    Colorectal Cancer Screening  09/04/2028       Flexible Sigmoidoscopy   Covered every 4 years -    Fecal Occult Blood Test Covered annually -   Prostate Cancer Screening    Prostate-Specific Antigen (PSA) Annually Lab Results   Component Value Date    PSA 15.10 (H) 02/23/2024     Health Maintenance   Topic Date Due    PSA  02/23/2026      Immunizations    Influenza Covered once per flu season  Please get every year 10/22/2024  No recommendations at this time    Pneumococcal Each vaccine ([id number] & [id number]) covered once after 65 Prevnar 13: 12/19/2018    [id number]: 12/20/2019     No recommendations at this time    Hepatitis B One screening covered for patients with certain risk factors   -  No recommendations at  this time    Tetanus Toxoid Not covered by Medicare Part B unless medically necessary (cut with metal); may be covered with your pharmacy prescription benefits -    Tetanus, Diptheria and Pertusis TD and TDaP Not covered by Medicare Part B -  No recommendations at this time    Zoster Not covered by Medicare Part B; may be covered with your pharmacy  prescription benefits -  No recommendations at this time     Annual Monitoring of Persistent Medications (ACE/ARB, digoxin diuretics, anticonvulsants)    Potassium Annually Lab Results   Component Value Date    K 3.9 09/24/2024         Creatinine   Annually Lab Results   Component Value Date    CREATSERUM 1.05 09/24/2024         BUN Annually Lab Results   Component Value Date    BUN 15 09/24/2024       Drug Serum Conc Annually No results found for: \"DIGOXIN\", \"DIG\", \"VALP\"

## 2025-02-28 ENCOUNTER — TELEPHONE (OUTPATIENT)
Age: 70
End: 2025-02-28

## 2025-02-28 ENCOUNTER — TELEPHONE (OUTPATIENT)
Dept: FAMILY MEDICINE CLINIC | Facility: CLINIC | Age: 70
End: 2025-02-28

## 2025-02-28 DIAGNOSIS — D58.2 ELEVATED HEMOGLOBIN: Primary | ICD-10-CM

## 2025-02-28 NOTE — TELEPHONE ENCOUNTER
Pt is calling to schedule a new consult appt.    Patient Name and - Rigo Solomon 10/29/1955  New Consult- Dr. Rivrea  Referred by- Dr. Kaylin Lyon, Phone# [phone]  Reason: Elevated Hemoglobin  Insurance- MCR and [employer] PPO (E-verified)  Referral: In Epic  Please give pt a call back. Thank you.

## 2025-03-27 ENCOUNTER — OFFICE VISIT (OUTPATIENT)
Age: 70
End: 2025-03-27
Attending: INTERNAL MEDICINE
Payer: MEDICARE

## 2025-03-27 VITALS
SYSTOLIC BLOOD PRESSURE: 156 MMHG | OXYGEN SATURATION: 98 % | RESPIRATION RATE: 18 BRPM | HEART RATE: 61 BPM | DIASTOLIC BLOOD PRESSURE: 86 MMHG | BODY MASS INDEX: 29 KG/M2 | WEIGHT: 212 LBS | TEMPERATURE: 97 F

## 2025-03-27 DIAGNOSIS — R77.9 ELEVATED BLOOD PROTEIN: ICD-10-CM

## 2025-03-27 DIAGNOSIS — D75.1 ERYTHROCYTOSIS: Primary | ICD-10-CM

## 2025-03-27 LAB
BASOPHILS # BLD AUTO: 0.02 X10(3) UL (ref 0–0.2)
BASOPHILS NFR BLD AUTO: 0.3 %
EOSINOPHIL # BLD AUTO: 0.07 X10(3) UL (ref 0–0.7)
EOSINOPHIL NFR BLD AUTO: 0.9 %
ERYTHROCYTE [DISTWIDTH] IN BLOOD BY AUTOMATED COUNT: 13.2 %
HCT VFR BLD AUTO: 52.8 %
HGB BLD-MCNC: 18 G/DL
IGA SERPL-MCNC: 224.9 MG/DL (ref 40–350)
IGM SERPL-MCNC: 96.3 MG/DL (ref 50–300)
IMM GRANULOCYTES # BLD AUTO: 0.02 X10(3) UL (ref 0–1)
IMM GRANULOCYTES NFR BLD: 0.3 %
IMMUNOGLOBULIN PNL SER-MCNC: 1359 MG/DL (ref 650–1600)
LDH SERPL L TO P-CCNC: 158 U/L
LYMPHOCYTES # BLD AUTO: 1.74 X10(3) UL (ref 1–4)
LYMPHOCYTES NFR BLD AUTO: 23.3 %
MCH RBC QN AUTO: 31 PG (ref 26–34)
MCHC RBC AUTO-ENTMCNC: 34.1 G/DL (ref 31–37)
MCV RBC AUTO: 90.9 FL
MONOCYTES # BLD AUTO: 0.86 X10(3) UL (ref 0.1–1)
MONOCYTES NFR BLD AUTO: 11.5 %
NEUTROPHILS # BLD AUTO: 4.77 X10 (3) UL (ref 1.5–7.7)
NEUTROPHILS # BLD AUTO: 4.77 X10(3) UL (ref 1.5–7.7)
NEUTROPHILS NFR BLD AUTO: 63.7 %
PLATELET # BLD AUTO: 210 10(3)UL (ref 150–450)
RBC # BLD AUTO: 5.81 X10(6)UL
URATE SERPL-MCNC: 6.2 MG/DL
WBC # BLD AUTO: 7.5 X10(3) UL (ref 4–11)

## 2025-03-27 NOTE — PROGRESS NOTES
Patient is here today for Consult with Dr. Rivera for high hemoglobin. Patient denies pain. Medication list and medical history were reviewed and updated.     Education Record    Learner:  Patient      Disease / Diagnosis: Hematology Consult    Barriers / Limitations:  None   Comments:    Method:  Brief focused, Discussion, Printed material and Reinforcement   Comments:    General Topics:  Medication, Pain, Procedure and Plan of care reviewed   Comments:    Outcome:  Shows understanding   Comments:    Labs Today per Dr. Rivera    AVS provided and follow up reviewed.  Patient instructed to call as needed.

## 2025-03-27 NOTE — CONSULTS
[facility] Report of Consultation    Patient Name: Rigo Solomon   YOB: 1955   Medical Record Number: [id number]   CSN: [id number]   Consulting Physician: Concha Rivera MD  Referring Physician(s): Kaylin Lyon MD    Patient Name: Rigo Solomon   YOB: 1955   Medical Record Number: [id number]   CSN: [id number]   Consulting Physician: Concha Rivera MD  Date of Consultation: 3/27/2025     Reason for Consultation:  Rigo Solomon was seen today for the diagnosis of erythrocytosis      History of Present Illness:   68 y/o gentleman who was noted to have an elevated hemoglobin/hematocrit on routine labs. He said this has happened intermittently in the past but last H/H were higher than usual. Most recent CBC was from 2/26/24: 5.7>19.2/57.9<189. He reports feeling well. Denies SOB, chest or abdominal pain, change in bowel habits, headaches, dizziness or visual symptoms. He says he has always had a radames complexion. No h/o erythromelalgia, thrombosis, bleeding, pruritus or GI symptoms.     He has no known h/o pulmonary disorders including sleep apnea. He does not know if he snores.     He has a h/o elevated PSA (11.9 from 2/2025) with biopsy 5/2024 negative for malignancy      Past Medical History:  Past Medical History:    Essential hypertension    Seasonal allergies       Past Surgical History:  Past Surgical History:   Procedure Laterality Date    Anal sphincterotomy      2012     Biopsy of prostate,incisional      negative    Colonoscopy  01/01/2008    due in 10 years,     Hemorrhoidectomy  01/01/2008    banding    Oral surgery procedure  01/01/1990    cyst    Other surgical history  08/01/2015    right wrist surgery    Tonsillectomy  01/01/1966    Vasectomy  01/01/1985       Family Medical History:  Family History   Problem Relation Age of Onset    Other (Parkinsons) Mother     Other (parkinsons) Maternal Grandmother     Cancer Maternal Grandfather         pancreatic ca 80     Heart Disease Paternal Grandfather     Other (Other) Paternal Grandfather         aneurysm    Hypertension Sister     Other (Other) Sister         overweight    Cancer Sister         breast ca in 40's     Other (GERD) Brother     Breast Cancer Other         Siblings       Psychosocial History:  Social History     Socioeconomic History    Marital status:      Spouse name: Not on file    Number of children: Not on file    Years of education: Not on file    Highest education level: Not on file   Occupational History    Not on file   Tobacco Use    Smoking status: Never    Smokeless tobacco: Never   Vaping Use    Vaping status: Never Used   Substance and Sexual Activity    Alcohol use: No     Alcohol/week: 0.0 standard drinks of alcohol    Drug use: No    Sexual activity: Yes   Other Topics Concern     Service Not Asked    Blood Transfusions Not Asked    Caffeine Concern No    Occupational Exposure Not Asked    Hobby Hazards Not Asked    Sleep Concern Not Asked    Stress Concern Not Asked    Weight Concern Not Asked    Special Diet Not Asked    Back Care Not Asked    Exercise Yes     Comment: walking     Bike Helmet Not Asked    Seat Belt Yes    Self-Exams No   Social History Narrative    Not on file     Social Drivers of Health     Food Insecurity: No Food Insecurity (2/26/2025)    NCSS - Food Insecurity     Worried About Running Out of Food in the Last Year: No     Ran Out of Food in the Last Year: No   Transportation Needs: No Transportation Needs (2/26/2025)    NCSS - Transportation     Lack of Transportation: No   Housing Stability: Not At Risk (2/26/2025)    NCSS - Housing/Utilities     Has Housing: Yes     Worried About Losing Housing: No     Unable to Get Utilities: No       Allergies:   Allergies[1]    Current Medications:    Current Outpatient Medications:     lisinopril 10 MG Oral Tab, Take 1 tablet (10 mg total) by mouth daily., Disp: 90 tablet, Rfl: 1    Review of Systems:  A 14-point ROS was  done with pertinent positives and negative per the HPI    Vital Signs:  /86 (BP Location: Left arm, Patient Position: Sitting, Cuff Size: adult)   Pulse 61   Temp 97.1 °F (36.2 °C) (Temporal)   Resp 18   Wt 96.2 kg (212 lb)   SpO2 98%   BMI 28.75 kg/m²     Physical Examination:  General: Patient is alert and oriented x 3, not in acute distress. Facial plethora  HEENT: EOMs intact. PERRL. Oropharynx is clear.   Neck: No JVD. No palpable lymphadenopathy. Neck is supple.  Chest: Clear to auscultation.  Heart: Regular rate and rhythm.   Abdomen: Soft, non tender with good bowel sounds.  Extremities: Pedal pulses are present. No edema.  Neurological: Grossly intact.   Lymphatics: There is no palpable lymphadenopathy  Psych/Depression: Mood and affect are appropriate.    Laboratory:  Lab Component   Component Value Date/Time    RED BLOOD COUNT 5.81 (H) 11/13/2012 1726    RBC 6.26 (H) 02/26/2025 1101    RBC 5.65 09/24/2024 0754    RBC 5.81 (H) 02/23/2024 0821    HGB 19.2 (H) 02/26/2025 1101    HGB 17.2 09/24/2024 0754    HGB 17.9 (H) 02/23/2024 0821    HGB 17.8 (H) 11/13/2012 1726    HCT 57.9 (H) 02/26/2025 1101    HCT 51.8 09/24/2024 0754    HCT 53.3 (H) 02/23/2024 0821    HCT 52.1 11/13/2012 1726    MCV 92.5 02/26/2025 1101    MCV 91.7 09/24/2024 0754    MCV 91.7 02/23/2024 0821    MEAN CELL VOLUME 89.7 11/13/2012 1726    MCH 30.7 02/26/2025 1101    MCH 30.4 09/24/2024 0754    MCH 30.8 02/23/2024 0821    Mean Corpuscular Hemoglobin 30.6 11/13/2012 1726    MCHC 33.2 02/26/2025 1101    MCHC 33.2 09/24/2024 0754    MCHC 33.6 02/23/2024 0821    Mean Corpuscular HGB Conc 34.2 11/13/2012 1726    RDW 13.2 02/26/2025 1101    RDW 13.5 09/24/2024 0754    RDW 13.2 02/23/2024 0821    RED CELL DISTRIBUTION WIDTH 13.2 11/13/2012 1726    Neutrophil Absolute Prelim 3.26 02/26/2025 1101    Neutrophil Absolute Prelim 3.51 09/24/2024 0754    Neutrophil Absolute Prelim 4.16 02/23/2024 0821    WBC 5.7 02/26/2025 1101    WBC 6.0  09/24/2024 0754    WBC 6.4 02/23/2024 0821    WBC 6.5 11/13/2012 1726    .0 02/26/2025 1101    .0 09/24/2024 0754    .0 02/23/2024 0821    .0 11/13/2012 1726       Lab Component   Component Value Date/Time    GLUCOSE 98 11/13/2012 1726    Glucose 81 02/26/2025 1101    Glucose 78 09/24/2024 0754    Glucose 98 02/23/2024 0821    BUN 12 02/26/2025 1101    BUN 15 09/24/2024 0754    BUN 17 02/23/2024 0821    BUN 14 11/13/2012 1726    CREATININE 1.0 11/13/2012 1726    Creatinine 1.18 02/26/2025 1101    Creatinine 1.05 09/24/2024 0754    Creatinine 1.11 02/23/2024 0821    GFR  > 90 11/13/2012 1726    GFR, -American 80 02/15/2022 0842    GFR, -American 76 01/28/2021 0919    GFR, -American 86 06/26/2020 0848    GFR NON- 81 11/13/2012 1726    GFR, Non- 69 02/15/2022 0842    GFR, Non- 65 01/28/2021 0919    GFR, Non- 74 06/26/2020 0848    CALCIUM 9.5 11/13/2012 1726    Calcium, Total 9.8 02/26/2025 1101    Calcium, Total 9.7 09/24/2024 0754    Calcium, Total 9.9 02/23/2024 0821    Albumin 5.2 (H) 02/26/2025 1101    Albumin 4.3 09/24/2024 0754    Albumin 4.1 02/23/2024 0821    Sodium 135 (L) 02/26/2025 1101    Sodium 139 09/24/2024 0754    Sodium 139 02/23/2024 0821    SODIUM 139 11/13/2012 1726    Potassium 4.0 02/26/2025 1101    Potassium 3.9 09/24/2024 0754    Potassium 4.2 02/23/2024 0821    POTASSIUM 4.6 11/13/2012 1726    Chloride 104 02/26/2025 1101    Chloride 108 09/24/2024 0754    Chloride 108 02/23/2024 0821    CHLORIDE 103 11/13/2012 1726    CO2 28.0 02/26/2025 1101    CO2 25.0 09/24/2024 0754    CO2 26.0 02/23/2024 0821    CO2 30.0 11/13/2012 1726    Alkaline Phosphatase 88 02/26/2025 1101    Alkaline Phosphatase 79 09/24/2024 0754    Alkaline Phosphatase 79 02/23/2024 0821    AST 34 (H) 02/26/2025 1101    AST 28 09/24/2024 0754    AST 28 02/23/2024 0821    ALT 30 02/26/2025 1101    ALT 32  09/24/2024 0754    ALT 35 02/23/2024 0821    Bilirubin, Total 1.3 (H) 02/26/2025 1101    Bilirubin, Total 1.0 09/24/2024 0754    Bilirubin, Total 0.9 02/23/2024 0821    Total Protein 8.6 (H) 02/26/2025 1101    Total Protein 7.6 09/24/2024 0754    Total Protein 8.0 02/23/2024 0821           Radiology:  This is an over read for the non-cardiac, non-vascular limited visualized portions of the chest and abdomen.     PROCEDURE:  CT CALCIUM SCORING OVER READ     LOCATION:                                           COMPARISON:  [facility] , CT, CT CALCIUM SCORING SPECIAL, 11/07/2015, 8:41 AM.     INDICATIONS:  Z13.6 Screening for heart disease     TECHNIQUE:  Unenhanced multislice CT scanning is performed through the chest as part of a cardiac CT evaluation.  Dose reduction techniques were used. Dose information is transmitted to the ACR (American College of Radiology) NRDR (National Radiology  Data Registry) which includes the Dose Index Registry.     PATIENT STATED HISTORY: (As transcribed by Technologist)           FINDINGS:    LUNGS:  No visible pulmonary disease.    FARIDA:  No mass or adenopathy.    MEDIASTINUM:  No mass or adenopathy.    PLEURA:  No mass or effusion.    CHEST WALL:  No mass or axillary adenopathy.    LIMITED ABDOMEN:  Limited images of the upper abdomen are unremarkable.    BONES:  No bony lesion or fracture.          This is an over read for the non-cardiac, non-vascular limited visualized portions of the chest and abdomen. This exam was not performed as a complete diagnostic CT of the chest. Please see the cardiologists' dictation which is reported separately.                  Impression  CONCLUSION:  No significant incidental findings.           Dictated by (CST): Irene Wallace MD on 3/11/2023 at 2:06 PM      Finalized by (CST): Irene Wallace MD on 3/11/2023 at 2:08 PM        Impression & Plan:   1. Erythrocytosis  - ongoing since at least 2012 (as far back as we have labs available for review in [facility])  -  says always has had a radames complexion but otherwise feels fine  - no known underlying h/o JUAN or pulmonary  disorders; n/o familial polycythemia    2. Elevated PSA  - followed by   - prostate biopsy in 2024 negative for malignancy  - prostate MRI showed BPH and peripheral zone inflammation w/o index lesion seen    3. Elevated protein  - noted on recent labs.   - check SPEP/ESTEPHANIE    Labs as ordered    RTC 2 weeks     I spent 30 minutes face to face with the patient.  More than 50% of that time was spent counseling the patient and/or on coordination of care.     Concha Rivera MD  [facility]          [1]   Allergies  Allergen Reactions    Naprosyn [Naproxen] RASH     Rash up and down arms    Cefdinir DIARRHEA     diarrhea    Other DIARRHEA     Unknown antibiotic for UTI

## 2025-03-28 LAB — ERYTHROPOIETIN: 10.6 MIU/ML

## 2025-04-02 LAB
ALBUMIN SERPL ELPH-MCNC: 4.41 G/DL (ref 3.75–5.21)
ALBUMIN/GLOB SERPL: 1.48 {RATIO} (ref 1–2)
ALPHA1 GLOB SERPL ELPH-MCNC: 0.24 G/DL (ref 0.19–0.46)
ALPHA2 GLOB SERPL ELPH-MCNC: 0.7 G/DL (ref 0.48–1.05)
B-GLOBULIN SERPL ELPH-MCNC: 0.75 G/DL (ref 0.68–1.23)
GAMMA GLOB SERPL ELPH-MCNC: 1.29 G/DL (ref 0.62–1.7)
KAPPA LC FREE SER-MCNC: 2.36 MG/DL (ref 0.33–1.94)
KAPPA LC FREE/LAMBDA FREE SER NEPH: 1.04 {RATIO} (ref 0.26–1.65)
LAMBDA LC FREE SERPL-MCNC: 2.27 MG/DL (ref 0.57–2.63)
PROT SERPL-MCNC: 7.4 G/DL (ref 5.7–8.2)

## 2025-04-08 ENCOUNTER — OFFICE VISIT (OUTPATIENT)
Dept: FAMILY MEDICINE CLINIC | Facility: CLINIC | Age: 70
End: 2025-04-08
Payer: MEDICARE

## 2025-04-08 VITALS
WEIGHT: 209 LBS | SYSTOLIC BLOOD PRESSURE: 132 MMHG | TEMPERATURE: 98 F | OXYGEN SATURATION: 99 % | RESPIRATION RATE: 18 BRPM | BODY MASS INDEX: 28 KG/M2 | HEART RATE: 60 BPM | DIASTOLIC BLOOD PRESSURE: 80 MMHG

## 2025-04-08 DIAGNOSIS — J01.40 ACUTE NON-RECURRENT PANSINUSITIS: Primary | ICD-10-CM

## 2025-04-08 PROCEDURE — 99213 OFFICE O/P EST LOW 20 MIN: CPT

## 2025-04-08 NOTE — PROGRESS NOTES
Subjective:   Patient ID: Rigo Solomon is a 69 year old male.    Patient presents to clinic with 10 days of sinus congestion, cough, post nasal drip and sore throat. Denies fever or known exposures. Has taken albuterol at the start of symptoms, coricidin, and cough drops for symptoms. Denies chest pain or shortness of breath.     Sinus Problem  This is a new problem. The current episode started 1 to 4 weeks ago. The problem is unchanged. There has been no fever. Associated symptoms include congestion, coughing, sinus pressure and a sore throat. Pertinent negatives include no shortness of breath.       History/Other:   Review of Systems   Constitutional:  Negative for fatigue and fever.   HENT:  Positive for congestion, postnasal drip, sinus pressure and sore throat.    Respiratory:  Positive for cough. Negative for shortness of breath.    All other systems reviewed and are negative.    Current Outpatient Medications   Medication Sig Dispense Refill    amoxicillin clavulanate 875-125 MG Oral Tab Take 1 tablet by mouth 2 (two) times daily for 10 days. 20 tablet 0    lisinopril 10 MG Oral Tab Take 1 tablet (10 mg total) by mouth daily. 90 tablet 1     Allergies:Allergies[1]    Objective:   Physical Exam  Vitals reviewed.   Constitutional:       General: He is not in acute distress.     Appearance: Normal appearance. He is not ill-appearing or toxic-appearing.   HENT:      Head: Normocephalic and atraumatic.      Right Ear: Tympanic membrane, ear canal and external ear normal. No middle ear effusion. Tympanic membrane is not erythematous, retracted or bulging.      Left Ear: Tympanic membrane, ear canal and external ear normal.  No middle ear effusion. Tympanic membrane is not erythematous, retracted or bulging.      Nose: Nose normal.      Mouth/Throat:      Mouth: Mucous membranes are moist.      Pharynx: Oropharynx is clear. Postnasal drip present. No posterior oropharyngeal erythema.   Cardiovascular:      Rate  and Rhythm: Normal rate and regular rhythm.      Pulses: Normal pulses.      Heart sounds: Normal heart sounds.   Pulmonary:      Effort: Pulmonary effort is normal. No respiratory distress.      Breath sounds: Normal breath sounds. No decreased breath sounds, wheezing, rhonchi or rales.      Comments: No cough during exam  Musculoskeletal:         General: Normal range of motion.      Cervical back: Normal range of motion and neck supple.   Lymphadenopathy:      Cervical: No cervical adenopathy.   Skin:     General: Skin is warm and dry.      Capillary Refill: Capillary refill takes less than 2 seconds.   Neurological:      General: No focal deficit present.      Mental Status: He is alert and oriented to person, place, and time.   Psychiatric:         Mood and Affect: Mood normal.         Behavior: Behavior normal.         Assessment & Plan:   1. Acute non-recurrent pansinusitis        Discussion about supportive treatment including over the counter medications, hydration and rest. Follow up with PCP if symptoms continue.       Meds This Visit:  Requested Prescriptions     Signed Prescriptions Disp Refills    amoxicillin clavulanate 875-125 MG Oral Tab 20 tablet 0     Sig: Take 1 tablet by mouth 2 (two) times daily for 10 days.       Imaging & Referrals:  None         [1]   Allergies  Allergen Reactions    Naprosyn [Naproxen] RASH     Rash up and down arms    Cefdinir DIARRHEA     diarrhea    Other DIARRHEA     Unknown antibiotic for UTI

## 2025-04-09 ENCOUNTER — OFFICE VISIT (OUTPATIENT)
Age: 70
End: 2025-04-09
Attending: INTERNAL MEDICINE
Payer: MEDICARE

## 2025-04-09 VITALS
BODY MASS INDEX: 28.58 KG/M2 | HEART RATE: 59 BPM | HEIGHT: 72.01 IN | WEIGHT: 211 LBS | OXYGEN SATURATION: 100 % | SYSTOLIC BLOOD PRESSURE: 157 MMHG | TEMPERATURE: 97 F | DIASTOLIC BLOOD PRESSURE: 80 MMHG | RESPIRATION RATE: 16 BRPM

## 2025-04-09 DIAGNOSIS — D75.1 ERYTHROCYTOSIS: Primary | ICD-10-CM

## 2025-04-09 DIAGNOSIS — R76.8 ELEVATED SERUM IMMUNOGLOBULIN FREE LIGHT CHAIN LEVEL: ICD-10-CM

## 2025-04-09 DIAGNOSIS — R77.9 ELEVATED BLOOD PROTEIN: ICD-10-CM

## 2025-04-09 NOTE — PROGRESS NOTES
[facility] Progress Note    Patient Name: Rigo Solomon   YOB: 1955   Medical Record Number: [id number]   CSN: [id number]   Attending Physician: Concha Rivera M.D.   Referring Physician: Kaylin Lyon MD      Date of Visit: 4/9/2025     Chief Complaint:  Chief Complaint   Patient presents with    Follow - Up     Pt here to review recent test results. He is feeling well, on antibiotics for recent cough, no other c/o.      Hem/Onc History:  Erythrocytosis     -  noted to have an elevated hemoglobin/hematocrit on routine labs. He said this has happened intermittently in the past but last H/H were higher than usual. Most recent CBC was from 2/26/24: 5.7>19.2/57.9<189. He reports feeling well. Denies SOB, chest or abdominal pain, change in bowel habits, headaches, dizziness or visual symptoms. He says he has always had a radames complexion. No h/o erythromelalgia, thrombosis, bleeding, pruritus or GI symptoms.     He has no known h/o pulmonary disorders including sleep apnea. He does not know if he snores.     He has a h/o elevated PSA (11.9 from 2/2025) with biopsy 5/2024 negative for malignanc. He is not on testosterone or supplements      History of Present Illness:   Here to discuss results of w/u done for erythrocytosis. Was seen at  yesterday for presumed URI with increased cough. Placed on Augmentin. No other complaints.       Past Medical History:  Past Medical History:    Essential hypertension    Seasonal allergies       Past Surgical History:  Past Surgical History:   Procedure Laterality Date    Anal sphincterotomy      2012     Biopsy of prostate,incisional      negative    Colonoscopy  01/01/2008    due in 10 years,     Colonoscopy  2018    Hemorrhoidectomy  01/01/2008    banding    Oral surgery procedure  01/01/1990    cyst    Other surgical history  08/01/2015    right wrist surgery    Tonsillectomy  01/01/1966    Vasectomy  01/01/1985       Family Medical History:  Family  History   Problem Relation Age of Onset    Other (Parkinsons) Mother     Other (parkinsons) Maternal Grandmother     Cancer Maternal Grandfather         pancreatic ca 80    Heart Disease Paternal Grandfather     Other (Other) Paternal Grandfather         aneurysm    Hypertension Sister     Other (Other) Sister         overweight    Cancer Sister         breast ca in 40's     Other (GERD) Brother     Breast Cancer Other         Siblings       Psychosocial History:  Social History     Socioeconomic History    Marital status:      Spouse name: Not on file    Number of children: Not on file    Years of education: Not on file    Highest education level: Not on file   Occupational History    Not on file   Tobacco Use    Smoking status: Never    Smokeless tobacco: Never   Vaping Use    Vaping status: Never Used   Substance and Sexual Activity    Alcohol use: Yes     Comment: 1 drink per month    Drug use: No    Sexual activity: Yes   Other Topics Concern     Service Not Asked    Blood Transfusions Not Asked    Caffeine Concern No    Occupational Exposure Not Asked    Hobby Hazards Not Asked    Sleep Concern Not Asked    Stress Concern Not Asked    Weight Concern Not Asked    Special Diet Not Asked    Back Care Not Asked    Exercise Yes     Comment: walking     Bike Helmet Not Asked    Seat Belt Yes    Self-Exams No   Social History Narrative    Not on file     Social Drivers of Health     Food Insecurity: No Food Insecurity (2/26/2025)    NCSS - Food Insecurity     Worried About Running Out of Food in the Last Year: No     Ran Out of Food in the Last Year: No   Transportation Needs: No Transportation Needs (2/26/2025)    NCSS - Transportation     Lack of Transportation: No   Housing Stability: Not At Risk (2/26/2025)    NCSS - Housing/Utilities     Has Housing: Yes     Worried About Losing Housing: No     Unable to Get Utilities: No       Allergies:   Allergies[1]    Current Medications:    Current  Outpatient Medications:     amoxicillin clavulanate 875-125 MG Oral Tab, Take 1 tablet by mouth 2 (two) times daily for 10 days., Disp: 20 tablet, Rfl: 0    lisinopril 10 MG Oral Tab, Take 1 tablet (10 mg total) by mouth daily., Disp: 90 tablet, Rfl: 1    Review of Systems:  A 14-point ROS was done with pertinent positives and negative per the HPI    Vital Signs:  /80 (BP Location: Right arm, Patient Position: Sitting, Cuff Size: large)   Pulse 59   Temp 97.2 °F (36.2 °C) (Temporal)   Resp 16   Ht 1.829 m (6' 0.01\")   Wt 95.7 kg (211 lb)   SpO2 100%   BMI 28.61 kg/m²     Physical Examination:  General: Patient is alert and oriented x 3, not in acute distress. Facial plethora  HEENT: EOMs intact. PERRL. Oropharynx is clear.   Neck: No JVD. No palpable lymphadenopathy. Neck is supple.  Chest: Clear to auscultation.  Heart: Regular rate and rhythm.   Abdomen: Soft, non tender with good bowel sounds.  Extremities: Pedal pulses are present. No edema.  Neurological: Grossly intact.   Lymphatics: There is no palpable lymphadenopathy  Psych/Depression: Mood and affect are appropriate.    Laboratory:  Lab Component   Component Value Date/Time    RED BLOOD COUNT 5.81 (H) 11/13/2012 1726    RBC 5.81 (H) 03/27/2025 1534    RBC 6.26 (H) 02/26/2025 1101    RBC 5.65 09/24/2024 0754    HGB 18.0 (H) 03/27/2025 1534    HGB 19.2 (H) 02/26/2025 1101    HGB 17.2 09/24/2024 0754    HGB 17.8 (H) 11/13/2012 1726    HCT 52.8 03/27/2025 1534    HCT 57.9 (H) 02/26/2025 1101    HCT 51.8 09/24/2024 0754    HCT 52.1 11/13/2012 1726    MCV 90.9 03/27/2025 1534    MCV 92.5 02/26/2025 1101    MCV 91.7 09/24/2024 0754    MEAN CELL VOLUME 89.7 11/13/2012 1726    MCH 31.0 03/27/2025 1534    MCH 30.7 02/26/2025 1101    MCH 30.4 09/24/2024 0754    Mean Corpuscular Hemoglobin 30.6 11/13/2012 1726    MCHC 34.1 03/27/2025 1534    MCHC 33.2 02/26/2025 1101    MCHC 33.2 09/24/2024 0754    Mean Corpuscular HGB Conc 34.2 11/13/2012 1726    RDW  13.2 03/27/2025 1534    RDW 13.2 02/26/2025 1101    RDW 13.5 09/24/2024 0754    RED CELL DISTRIBUTION WIDTH 13.2 11/13/2012 1726    Neutrophil Absolute Prelim 4.77 03/27/2025 1534    Neutrophil Absolute Prelim 3.26 02/26/2025 1101    Neutrophil Absolute Prelim 3.51 09/24/2024 0754    WBC 7.5 03/27/2025 1534    WBC 5.7 02/26/2025 1101    WBC 6.0 09/24/2024 0754    WBC 6.5 11/13/2012 1726    .0 03/27/2025 1534    .0 02/26/2025 1101    .0 09/24/2024 0754    .0 11/13/2012 1726       Lab Component   Component Value Date/Time    GLUCOSE 98 11/13/2012 1726    Glucose 81 02/26/2025 1101    Glucose 78 09/24/2024 0754    Glucose 98 02/23/2024 0821    BUN 12 02/26/2025 1101    BUN 15 09/24/2024 0754    BUN 17 02/23/2024 0821    BUN 14 11/13/2012 1726    CREATININE 1.0 11/13/2012 1726    Creatinine 1.18 02/26/2025 1101    Creatinine 1.05 09/24/2024 0754    Creatinine 1.11 02/23/2024 0821    GFR  > 90 11/13/2012 1726    GFR, -American 80 02/15/2022 0842    GFR, -American 76 01/28/2021 0919    GFR, -American 86 06/26/2020 0848    GFR NON- 81 11/13/2012 1726    GFR, Non- 69 02/15/2022 0842    GFR, Non- 65 01/28/2021 0919    GFR, Non- 74 06/26/2020 0848    CALCIUM 9.5 11/13/2012 1726    Calcium, Total 9.8 02/26/2025 1101    Calcium, Total 9.7 09/24/2024 0754    Calcium, Total 9.9 02/23/2024 0821    Albumin 4.41 03/27/2025 1534    Albumin 5.2 (H) 02/26/2025 1101    Albumin 4.3 09/24/2024 0754    Albumin 4.1 02/23/2024 0821    Sodium 135 (L) 02/26/2025 1101    Sodium 139 09/24/2024 0754    Sodium 139 02/23/2024 0821    SODIUM 139 11/13/2012 1726    Potassium 4.0 02/26/2025 1101    Potassium 3.9 09/24/2024 0754    Potassium 4.2 02/23/2024 0821    POTASSIUM 4.6 11/13/2012 1726    Chloride 104 02/26/2025 1101    Chloride 108 09/24/2024 0754    Chloride 108 02/23/2024 0821    CHLORIDE 103 11/13/2012 1726    CO2  28.0 02/26/2025 1101    CO2 25.0 09/24/2024 0754    CO2 26.0 02/23/2024 0821    CO2 30.0 11/13/2012 1726    Alkaline Phosphatase 88 02/26/2025 1101    Alkaline Phosphatase 79 09/24/2024 0754    Alkaline Phosphatase 79 02/23/2024 0821    AST 34 (H) 02/26/2025 1101    AST 28 09/24/2024 0754    AST 28 02/23/2024 0821    ALT 30 02/26/2025 1101    ALT 32 09/24/2024 0754    ALT 35 02/23/2024 0821    Bilirubin, Total 1.3 (H) 02/26/2025 1101    Bilirubin, Total 1.0 09/24/2024 0754    Bilirubin, Total 0.9 02/23/2024 0821    Total Protein 7.4 03/27/2025 1534    Total Protein 8.6 (H) 02/26/2025 1101    Total Protein 7.6 09/24/2024 0754      Latest Reference Range & Units 03/27/25 15:34    - 246 U/L 158   URIC ACID 3.7 - 9.2 mg/dL 6.2   KAPPA FREE LIGHT CHAIN 0.330 - 1.940 mg/dL 2.364 (H)   LAMBDA FREE LIGHT CHAIN 0.571 - 2.630 mg/dL 2.267   KAPPA/LAMBDA FLC RATIO 0.26 - 1.65  1.04   PROTEIN, TOTAL 5.7 - 8.2 g/dL 7.4   Albumin 3.75 - 5.21 g/dL 4.41   ALPHA-1-GLOBULINS 0.19 - 0.46 g/dL 0.24   ALPHA-2-GLOBULINS 0.48 - 1.05 g/dL 0.70   BETA GLOBULINS 0.68 - 1.23 g/dL 0.75   GAMMA GLOBULINS 0.62 - 1.70 g/dL 1.29   ALBUMIN/GLOBULIN RATIO 1.00 - 2.00  1.48   SPE INTERPRETATION  No apparent monoclonal protein on serum electrophoresis.     IMMUNOFIXATION  No monoclonal protein detected by immunofixation.     ERYTHROPOIETIN (EPO) 2.6 - 18.5 mIU/mL 10.6   (H): Data is abnormally high     Latest Reference Range & Units 03/27/25 15:34   IMMUNOGLOBULIN A 40.00 - 350.00 mg/dL 224.90   IMMUNOGLOBULIN G 650 - 1,600 mg/dL 1,359   IMMUNOGLOBULIN M 50.0 - 300.0 mg/dL 96.3           Component  Ref Range & Units (hover) 3/27/25 1534   JAK2 V617F Result Comment   Comment: NEGATIVE  The JAK2 V617F mutation is not detected in the provided specimen  of this individual. Results should be interpreted in conjunction  with clinical and other laboratory findings for the most accurate  interpretation.        Component 3/27/25 1534   E12-15 RESULT  Comment   Comment: NEGATIVE      JAK2 mutations were not detected in exons 12, 13, 14 and 15.  The G to T nucleotide change encoding the V617F mutation was not  detected. This result does not rule out the presence of JAK2  mutation at a level below the detection sensitivity of this  assay, the presence of other mutations outside the analyzed  region of the JAK2 gene, or the presence of a myeloproliferative  or other neoplasm. Result must be correlated with other clinical  data for the most accurate diagnosis.   MPL RESULT Comment   Comment: NEGATIVE  No MPL mutation was identified in the provided specimen of this  individual. Results should be interpreted in conjunction with  clinical and other laboratory findings for the most accurate  interpretation.   CALR RESULT Comment   Comment: NEGATIVE  No insertions or deletions were detected within the analyzed region  of the calreticulin (CALR) gene.  A negative result does not entirely exclude the possibility of a  clonal population carrying CALR gene mutations that are not covered  by this assay. Results should be interpreted in conjunction with  clinical and laboratory findings for the most accurate interpretation.       Radiology:  This is an over read for the non-cardiac, non-vascular limited visualized portions of the chest and abdomen.     PROCEDURE:  CT CALCIUM SCORING OVER READ     LOCATION:                                           COMPARISON:  MIREILLE GILLIS, CT CALCIUM SCORING SPECIAL, 11/07/2015, 8:41 AM.     INDICATIONS:  Z13.6 Screening for heart disease     TECHNIQUE:  Unenhanced multislice CT scanning is performed through the chest as part of a cardiac CT evaluation.  Dose reduction techniques were used. Dose information is transmitted to the ACR (American College of Radiology) NRDR (National Radiology  Data Registry) which includes the Dose Index Registry.     PATIENT STATED HISTORY: (As transcribed by Technologist)           FINDINGS:    LUNGS:  No visible  pulmonary disease.    FARIDA:  No mass or adenopathy.    MEDIASTINUM:  No mass or adenopathy.    PLEURA:  No mass or effusion.    CHEST WALL:  No mass or axillary adenopathy.    LIMITED ABDOMEN:  Limited images of the upper abdomen are unremarkable.    BONES:  No bony lesion or fracture.          This is an over read for the non-cardiac, non-vascular limited visualized portions of the chest and abdomen. This exam was not performed as a complete diagnostic CT of the chest. Please see the cardiologists' dictation which is reported separately.                  Impression  CONCLUSION:  No significant incidental findings.           Dictated by (CST): Irene Wallace MD on 3/11/2023 at 2:06 PM      Finalized by (CST): Irene Wallace MD on 3/11/2023 at 2:08 PM      PROCEDURE:  CT CALCIUM SCORING      COMPARISON:  None.      INDICATIONS:  Z13.6 Screening for heart disease      TECHNIQUE:  The patient was placed in the supine position on the multidector CT table at [facility].  EKG Gated was used to minimize cardiac motion. Non contrast 2mm axial cross sectional images were obtained in a narrow field of view to display   heart. Dose reduction techniques were used. Dose information is transmitted to the ACR (American College of Radiology) NRDR (National Radiology Data Registry) which includes the Dose Index Registry. See the Radiologist's over-read for evaluation of   non-cardiac and non-vascular structures.      FINDINGS:    CALCIUM SCORING RESULTS (Volume / Agatston):      LEFT MAIN:        0.00 / 0.00   RCA:          0.00 / 0.00   LAD:          0.00 / 0.00   CIRCUMFLEX:      0.00 / 0.00      TOTAL:        0.00 / 0.00      Your Coronary Artery Calcium Score: Zero      CARDIAC INCIDENTAL FINDING: Possible dilated Thoracic Aorta ( 3.9-4.0 cm). If indicated, recommend Gated CT angiogram of thoracic aorta for further evaluation.      Clinical Relevance of Coronary Artery Scan Results (may change depending on age and sex of  patient.)      Calcium Score  Implication  Risk of Coronary Artery Disease      0  No identifiable plaque  Very low, generally less than 5%      1-10  Minimal identifiable plaque  Very unlikely, less than 10%        Definite, at least mild atherosclerotic plaque  Mild risk, mild coronary atherosclerosis likely      101-400  Definite, at least moderate atherosclerotic plaque  Moderate risk, mild coronary artery disease highly likely,       significant atherosclerosis possible      401-1000  Definite, at least moderate atherosclerotic plaque  High risk, moderate coronary artery disease highly likely      >1000  Definite, moderate to severe atherosclerotic plaque  High risk, moderate to severe coronary artery disease       highly likely      This patient identified you as their physician, if this is not correct please contact the Nurse [facility] at [phone] and they will assign this report to another physician.         Dictated by (CST): Navin Wallace MD on 3/19/2023 at 9:08 PM       Finalized by (CST): Navin Wallace MD on 3/19/2023 at 9:09 PM         Impression & Plan:   1. Erythrocytosis  - ongoing since at least 2012 (as far back as we have labs available for review in EPIC)  - says always has had a radames complexion but otherwise feels fine  - no known underlying h/o JUAN or pulmonary  disorders; n/o familial polycythemia  - not on testosterone or supplements  - NORMAN-2 negative and EPO normal rules out P vera. Rest of MPD w/u negative. 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 Will need to evaluate for possible cardio-pulmonary, renal and other potential secondary causes.   - no significant cardiac history with calcium screen score of zero. He reports h/o asthma. Has never been worked up for sleep apnea. I told him I would start with an ABG and sleep study. If negative would proceed with PFTs. CT C/A/P with contrast can be considered to rule out tumor associated polycythemia but the length of time he has  had this make this unlikely and would not push.     2. Elevated PSA  - followed by   - prostate biopsy in 2024 negative for malignancy  - prostate MRI showed BPH and peripheral zone inflammation w/o index lesion seen    3. Elevated protein  - noted on recent labs.   - repeat protein level normal and SPEP and ESTEPHANIE showed no monoclonal protein. Mildly elevated kappa free light chain levels felt to be reactive and not a paraproteinemia.   - serum immunoglobulin levels normal    Will have PCP order sleep study.   ABG ordered placed    I spent 30 minutes face to face with the patient.  More than 50% of that time was spent counseling the patient and/or on coordination of care.     Concha Rivera MD  [facility]            [1]   Allergies  Allergen Reactions    Naprosyn [Naproxen] RASH     Rash up and down arms    Cefdinir DIARRHEA     diarrhea    Other DIARRHEA     Unknown antibiotic for UTI

## 2025-04-16 ENCOUNTER — LAB ENCOUNTER (OUTPATIENT)
Dept: LAB | Facility: HOSPITAL | Age: 70
End: 2025-04-16
Attending: INTERNAL MEDICINE
Payer: MEDICARE

## 2025-04-16 DIAGNOSIS — D75.1 ERYTHROCYTOSIS: ICD-10-CM

## 2025-04-16 LAB
ARTERIAL PATENCY WRIST A: POSITIVE
BASE EXCESS BLDA CALC-SCNC: -0.3 MMOL/L (ref ?–2)
BODY TEMPERATURE: 98.6 F
COHGB MFR BLD: 1.3 % SAT (ref 0–3)
HCO3 BLDA-SCNC: 24.6 MEQ/L (ref 21–27)
HGB BLD-MCNC: 17.8 G/DL (ref 13–17.5)
METHGB MFR BLD: 0.9 % SAT (ref 0.4–1.5)
OXYHGB MFR BLDA: 96.6 % (ref 92–100)
PCO2 BLDA: 34 MM HG (ref 35–45)
PH BLDA: 7.44 [PH] (ref 7.35–7.45)
PO2 BLDA: 89 MM HG (ref 80–100)

## 2025-04-16 PROCEDURE — 36600 WITHDRAWAL OF ARTERIAL BLOOD: CPT

## 2025-04-16 PROCEDURE — 85018 HEMOGLOBIN: CPT

## 2025-04-16 PROCEDURE — 82805 BLOOD GASES W/O2 SATURATION: CPT

## 2025-04-16 PROCEDURE — 82375 ASSAY CARBOXYHB QUANT: CPT

## 2025-04-16 PROCEDURE — 83050 HGB METHEMOGLOBIN QUAN: CPT

## 2025-04-16 PROCEDURE — 36415 COLL VENOUS BLD VENIPUNCTURE: CPT

## 2025-06-07 ENCOUNTER — HOSPITAL ENCOUNTER (OUTPATIENT)
Facility: HOSPITAL | Age: 70
Setting detail: OBSERVATION
Discharge: HOME | End: 2025-06-09
Attending: EMERGENCY MEDICINE | Admitting: STUDENT IN AN ORGANIZED HEALTH CARE EDUCATION/TRAINING PROGRAM
Payer: MEDICARE

## 2025-06-07 DIAGNOSIS — T46.4X5A ANGIOTENSIN CONVERTING ENZYME INHIBITOR-AGGRAVATED ANGIOEDEMA, INITIAL ENCOUNTER: Primary | ICD-10-CM

## 2025-06-07 DIAGNOSIS — T78.3XXA ANGIOTENSIN CONVERTING ENZYME INHIBITOR-AGGRAVATED ANGIOEDEMA, INITIAL ENCOUNTER: Primary | ICD-10-CM

## 2025-06-07 LAB
ALBUMIN SERPL-MCNC: 4.6 G/DL (ref 3.2–4.8)
ALBUMIN/GLOB SERPL: 1.6 {RATIO} (ref 1–2)
ALP LIVER SERPL-CCNC: 87 U/L (ref 45–117)
ALT SERPL-CCNC: 23 U/L (ref 10–49)
ANION GAP SERPL CALC-SCNC: 11 MMOL/L (ref 0–18)
AST SERPL-CCNC: 26 U/L (ref ?–34)
BASOPHILS # BLD AUTO: 0.01 X10(3) UL (ref 0–0.2)
BASOPHILS NFR BLD AUTO: 0.1 %
BILIRUB SERPL-MCNC: 0.5 MG/DL (ref 0.2–1.1)
BUN BLD-MCNC: 23 MG/DL (ref 9–23)
CALCIUM BLD-MCNC: 9.4 MG/DL (ref 8.7–10.6)
CHLORIDE SERPL-SCNC: 106 MMOL/L (ref 98–112)
CO2 SERPL-SCNC: 22 MMOL/L (ref 21–32)
CREAT BLD-MCNC: 1.17 MG/DL (ref 0.7–1.3)
EGFRCR SERPLBLD CKD-EPI 2021: 67 ML/MIN/1.73M2 (ref 60–?)
EOSINOPHIL # BLD AUTO: 0.13 X10(3) UL (ref 0–0.7)
EOSINOPHIL NFR BLD AUTO: 1.6 %
ERYTHROCYTE [DISTWIDTH] IN BLOOD BY AUTOMATED COUNT: 13.5 %
GLOBULIN PLAS-MCNC: 2.8 G/DL (ref 2–3.5)
GLUCOSE BLD-MCNC: 108 MG/DL (ref 70–99)
HCT VFR BLD AUTO: 50.4 % (ref 39–53)
HGB BLD-MCNC: 17.3 G/DL (ref 13–17.5)
IMM GRANULOCYTES # BLD AUTO: 0.02 X10(3) UL (ref 0–1)
IMM GRANULOCYTES NFR BLD: 0.2 %
LYMPHOCYTES # BLD AUTO: 2.64 X10(3) UL (ref 1–4)
LYMPHOCYTES NFR BLD AUTO: 31.8 %
MCH RBC QN AUTO: 30.3 PG (ref 26–34)
MCHC RBC AUTO-ENTMCNC: 34.3 G/DL (ref 31–37)
MCV RBC AUTO: 88.3 FL (ref 80–100)
MONOCYTES # BLD AUTO: 0.81 X10(3) UL (ref 0.1–1)
MONOCYTES NFR BLD AUTO: 9.8 %
NEUTROPHILS # BLD AUTO: 4.69 X10 (3) UL (ref 1.5–7.7)
NEUTROPHILS # BLD AUTO: 4.69 X10(3) UL (ref 1.5–7.7)
NEUTROPHILS NFR BLD AUTO: 56.5 %
OSMOLALITY SERPL CALC.SUM OF ELEC: 292 MOSM/KG (ref 275–295)
PLATELET # BLD AUTO: 205 10(3)UL (ref 150–450)
POTASSIUM SERPL-SCNC: 4.1 MMOL/L (ref 3.5–5.1)
PROT SERPL-MCNC: 7.4 G/DL (ref 5.7–8.2)
RBC # BLD AUTO: 5.71 X10(6)UL (ref 3.8–5.8)
SODIUM SERPL-SCNC: 139 MMOL/L (ref 136–145)
WBC # BLD AUTO: 8.3 X10(3) UL (ref 4–11)

## 2025-06-07 RX ORDER — FAMOTIDINE 10 MG/ML
20 INJECTION, SOLUTION INTRAVENOUS ONCE
Status: COMPLETED | OUTPATIENT
Start: 2025-06-07 | End: 2025-06-07

## 2025-06-07 RX ORDER — DIPHENHYDRAMINE HYDROCHLORIDE 50 MG/ML
25 INJECTION, SOLUTION INTRAMUSCULAR; INTRAVENOUS ONCE
Status: COMPLETED | OUTPATIENT
Start: 2025-06-07 | End: 2025-06-07

## 2025-06-07 RX ORDER — METHYLPREDNISOLONE SODIUM SUCCINATE 125 MG/2ML
60 INJECTION INTRAMUSCULAR; INTRAVENOUS ONCE
Status: COMPLETED | OUTPATIENT
Start: 2025-06-07 | End: 2025-06-07

## 2025-06-08 PROBLEM — T46.4X5A ANGIOTENSIN CONVERTING ENZYME INHIBITOR-AGGRAVATED ANGIOEDEMA, INITIAL ENCOUNTER: Status: ACTIVE | Noted: 2025-06-08

## 2025-06-08 PROBLEM — T78.3XXA ANGIOTENSIN CONVERTING ENZYME INHIBITOR-AGGRAVATED ANGIOEDEMA, INITIAL ENCOUNTER: Status: ACTIVE | Noted: 2025-06-08

## 2025-06-08 PROCEDURE — 99223 1ST HOSP IP/OBS HIGH 75: CPT | Performed by: STUDENT IN AN ORGANIZED HEALTH CARE EDUCATION/TRAINING PROGRAM

## 2025-06-08 RX ORDER — PREDNISONE 20 MG/1
40 TABLET ORAL
Status: DISCONTINUED | OUTPATIENT
Start: 2025-06-08 | End: 2025-06-09

## 2025-06-08 RX ORDER — BENZONATATE 100 MG/1
200 CAPSULE ORAL 3 TIMES DAILY PRN
Status: DISCONTINUED | OUTPATIENT
Start: 2025-06-08 | End: 2025-06-09

## 2025-06-08 RX ORDER — SODIUM PHOSPHATE, DIBASIC AND SODIUM PHOSPHATE, MONOBASIC 7; 19 G/230ML; G/230ML
1 ENEMA RECTAL ONCE AS NEEDED
Status: DISCONTINUED | OUTPATIENT
Start: 2025-06-08 | End: 2025-06-09

## 2025-06-08 RX ORDER — FLUTICASONE PROPIONATE 50 MCG
2 SPRAY, SUSPENSION (ML) NASAL DAILY
COMMUNITY

## 2025-06-08 RX ORDER — FAMOTIDINE 20 MG/1
20 TABLET, FILM COATED ORAL 2 TIMES DAILY
Status: DISCONTINUED | OUTPATIENT
Start: 2025-06-08 | End: 2025-06-09

## 2025-06-08 RX ORDER — CHLORTHALIDONE 25 MG/1
25 TABLET ORAL DAILY
Status: DISCONTINUED | OUTPATIENT
Start: 2025-06-08 | End: 2025-06-09

## 2025-06-08 RX ORDER — ONDANSETRON 2 MG/ML
4 INJECTION INTRAMUSCULAR; INTRAVENOUS EVERY 6 HOURS PRN
Status: DISCONTINUED | OUTPATIENT
Start: 2025-06-08 | End: 2025-06-09

## 2025-06-08 RX ORDER — DIPHENHYDRAMINE HCL 25 MG
25 CAPSULE ORAL 3 TIMES DAILY
Status: DISCONTINUED | OUTPATIENT
Start: 2025-06-08 | End: 2025-06-09

## 2025-06-08 RX ORDER — ECHINACEA PURPUREA EXTRACT 125 MG
1 TABLET ORAL
Status: DISCONTINUED | OUTPATIENT
Start: 2025-06-08 | End: 2025-06-09

## 2025-06-08 RX ORDER — BISACODYL 10 MG
10 SUPPOSITORY, RECTAL RECTAL
Status: DISCONTINUED | OUTPATIENT
Start: 2025-06-08 | End: 2025-06-09

## 2025-06-08 RX ORDER — SENNOSIDES 8.6 MG
17.2 TABLET ORAL NIGHTLY PRN
Status: DISCONTINUED | OUTPATIENT
Start: 2025-06-08 | End: 2025-06-09

## 2025-06-08 RX ORDER — ONDANSETRON 2 MG/ML
4 INJECTION INTRAMUSCULAR; INTRAVENOUS EVERY 4 HOURS PRN
Status: DISCONTINUED | OUTPATIENT
Start: 2025-06-08 | End: 2025-06-08

## 2025-06-08 RX ORDER — ACETAMINOPHEN 500 MG
500 TABLET ORAL EVERY 4 HOURS PRN
Status: DISCONTINUED | OUTPATIENT
Start: 2025-06-08 | End: 2025-06-09

## 2025-06-08 RX ORDER — METOCLOPRAMIDE HYDROCHLORIDE 5 MG/ML
10 INJECTION INTRAMUSCULAR; INTRAVENOUS EVERY 8 HOURS PRN
Status: DISCONTINUED | OUTPATIENT
Start: 2025-06-08 | End: 2025-06-09

## 2025-06-08 RX ORDER — POLYETHYLENE GLYCOL 3350 17 G/17G
17 POWDER, FOR SOLUTION ORAL DAILY PRN
Status: DISCONTINUED | OUTPATIENT
Start: 2025-06-08 | End: 2025-06-09

## 2025-06-08 RX ORDER — ENOXAPARIN SODIUM 100 MG/ML
40 INJECTION SUBCUTANEOUS DAILY
Status: DISCONTINUED | OUTPATIENT
Start: 2025-06-08 | End: 2025-06-09

## 2025-06-08 NOTE — H&P
[facility]     Rigo Solomon Patient Status:  Emergency    10/29/1955 MRN [id number]   [facility] EMERGENCY DEPARTMENT Attending Daryl Rose*   Hosp Day # 0 PCP Kaylin Lyon MD     Chief Complaint: lip swelling    Subjective:    History of Present Illness:     Rigo Solomon is a 69 year old male with PMHx HTN/ HLD/ erythrocytosis who presented to the hospital for lip swelling. He was doing gardening earlier in the day yesterday and then went to a family party. He notes eating some spicy foods b ut nothing out off the normal. He then had upper lip swelling. He had prior issues with lip swelling about 10 years ago so he took some benadryl and was seeing if that would fix it. He then felt a dryness to his throat and decided to seek medical eval. He needed to be hospitalized about 10 years ago but at that time it was worse with right facial swelling/ diffuse hives and this was before he was on lisinopril.    History/Other:    Past Medical History:  Past Medical History[1]  Past Surgical History:   Past Surgical History[2]   Family History:   Family History[3]  Social History:    reports that he has never smoked. He has never used smokeless tobacco. He reports current alcohol use. He reports that he does not use drugs.     Allergies: Allergies[4]    Medications:  Medications Ordered Prior to Encounter[5]    Review of Systems:   A comprehensive review of systems was completed.    Pertinent positives and negatives noted in the HPI.    Objective:   Physical Exam:    /75   Pulse 62   Temp 98.1 °F (36.7 °C) (Temporal)   Resp 23   Ht 6' (1.829 m)   Wt 200 lb (90.7 kg)   SpO2 100%   BMI 27.12 kg/m²   General: No acute distress, Alert  Respiratory: No rhonchi, no wheezes  Cardiovascular: S1, S2. Regular rate and rhythm  Abdomen: Soft, Non-tender, non-distended, positive bowel sounds  Neuro: No new focal deficits  Extremities: No edema  HEENT:  swelling of upper lip, vocal cords without edema      Results:    Labs:      Labs Last 24 Hours:    Recent Labs   Lab 06/07/25  2242   RBC 5.71   HGB 17.3   HCT 50.4   MCV 88.3   MCH 30.3   MCHC 34.3   RDW 13.5   NEPRELIM 4.69   WBC 8.3   .0       Recent Labs   Lab 06/07/25  2242   *   BUN 23   CREATSERUM 1.17   EGFRCR 67   CA 9.4   ALB 4.6      K 4.1      CO2 22.0   ALKPHO 87   AST 26   ALT 23   BILT 0.5   TP 7.4       Estimated Glomerular Filtration Rate: 67 mL/min/1.73m2 (result from lab).    No results found for: \"PT\", \"INR\"    No results for input(s): \"TROP\", \"TROPHS\", \"CK\" in the last 168 hours.    No results for input(s): \"TROP\", \"PBNP\" in the last 168 hours.    No results for input(s): \"PCT\" in the last 168 hours.    Imaging: Imaging data reviewed in Epic.    Assessment & Plan:      #Suspected ACE-I angioedema vs allergy induced  #hx HTN  -labs WNL  -s/p epi/ solumedrol/ benadryl/ pepcid in ED  -unclear iof related to ACE-I vs allergic given prior hx hives/ facial swelling prior to initiation of ACE-i  -admit for monitoring of airway overnight  -stop lisinopril and start chlorthalidone 25 mg daily  -prednisone 40 mg daily  -advised pt to have allergy kin testing as out-pt given hx frequent hives/ lip swelling  -advise FU BMP in 1 week to assess renal function/ electrolytes after diuretic started      Plan of care discussed with ED physician    Diana Cast DO    Supplementary Documentation:     The 21st Century Cures Act makes medical notes like these available to patients in the interest of transparency. Please be advised this is a medical document. Medical documents are intended to carry relevant information, facts as evident, and the clinical opinion of the practitioner. The medical note is intended as peer to peer communication and may appear blunt or direct. It is written in medical language and may contain abbreviations or verbiage that are unfamiliar.                                        [1]   Past Medical History:   Essential hypertension    Seasonal allergies   [2]   Past Surgical History:  Procedure Laterality Date    Anal sphincterotomy      2012     Biopsy of prostate,incisional      negative    Colonoscopy  01/01/2008    due in 10 years,     Colonoscopy  2018    Hemorrhoidectomy  01/01/2008    banding    Oral surgery procedure  01/01/1990    cyst    Other surgical history  08/01/2015    right wrist surgery    Tonsillectomy  01/01/1966    Vasectomy  01/01/1985   [3]   Family History  Problem Relation Age of Onset    Other (Parkinsons) Mother     Other (parkinsons) Maternal Grandmother     Cancer Maternal Grandfather         pancreatic ca 80    Heart Disease Paternal Grandfather     Other (Other) Paternal Grandfather         aneurysm    Hypertension Sister     Other (Other) Sister         overweight    Cancer Sister         breast ca in 40's     Other (GERD) Brother     Breast Cancer Other         Siblings   [4]   Allergies  Allergen Reactions    Naprosyn [Naproxen] RASH     Rash up and down arms    Cefdinir DIARRHEA     diarrhea    Other DIARRHEA     Unknown antibiotic for UTI   [5]   No current facility-administered medications on file prior to encounter.     Current Outpatient Medications on File Prior to Encounter   Medication Sig Dispense Refill    lisinopril 10 MG Oral Tab Take 1 tablet (10 mg total) by mouth daily. 90 tablet 1

## 2025-06-08 NOTE — ED PROVIDER NOTES
Patient Seen in: [facility] Emergency Department        History  Chief Complaint   Patient presents with    Allergic Rxn Allergies     Stated Complaint: allergic reaction, lip swelling    Subjective:   HPI            69-year-old male presents today for evaluation.  Around 2 PM this afternoon, patient noted some upper lip tightness and swelling.  He denies any wheezing, rashes, vomiting or diarrhea.  He took some Benadryl without any significant improvement.  This evening, he thought the tightness in his lip had begun improving.  He did report a discomfort in his throat which prompted ER evaluation.  Patient states more recently, he has had dry throat at night which prompts him to  drink water in the evening to alleviate this.  Patient denies any new foods, medications or other potential allergic exposures.  Patient is on lisinopril.  He states he had 1 similar episode that occurred although thinks this was prior to taking lisinopril.      Objective:     Past Medical History:    Essential hypertension    Seasonal allergies              Past Surgical History:   Procedure Laterality Date    Anal sphincterotomy      2012     Biopsy of prostate,incisional      negative    Colonoscopy  01/01/2008    due in 10 years,     Colonoscopy  2018    Hemorrhoidectomy  01/01/2008    banding    Oral surgery procedure  01/01/1990    cyst    Other surgical history  08/01/2015    right wrist surgery    Tonsillectomy  01/01/1966    Vasectomy  01/01/1985                Social History     Socioeconomic History    Marital status:    Tobacco Use    Smoking status: Never    Smokeless tobacco: Never   Vaping Use    Vaping status: Never Used   Substance and Sexual Activity    Alcohol use: Yes     Comment: 1 drink per month    Drug use: No    Sexual activity: Yes   Other Topics Concern    Caffeine Concern No    Exercise Yes     Comment: walking     Seat Belt Yes    Self-Exams No     Social Drivers of Health     Food Insecurity: No  Food Insecurity (6/8/2025)    NCSS - Food Insecurity     Worried About Running Out of Food in the Last Year: No     Ran Out of Food in the Last Year: No   Transportation Needs: No Transportation Needs (6/8/2025)    NCSS - Transportation     Lack of Transportation: No   Housing Stability: Not At Risk (6/8/2025)    NCSS - Housing/Utilities     Has Housing: Yes     Worried About Losing Housing: No     Unable to Get Utilities: No                                Physical Exam    ED Triage Vitals [06/07/25 2223]   /81   Pulse 62   Resp 16   Temp 98.1 °F (36.7 °C)   Temp src Temporal   SpO2 94 %   O2 Device None (Room air)       Current Vitals:   Vital Signs  BP: (!) 116/94  Pulse: 59  Resp: 20  Temp: 97.4 °F (36.3 °C)  Temp src: Oral  MAP (mmHg): 97    Oxygen Therapy  SpO2: 92 %  O2 Device: None (Room air)  Pulse Oximetry Type: Continuous            Physical Exam  Vitals and nursing note reviewed.   Constitutional:       Appearance: Normal appearance.   HENT:      Head: Normocephalic.      Nose: Nose normal.      Mouth/Throat:      Mouth: Mucous membranes are moist.      Comments: Upper lip swelling noted.    No lower lip swelling.    No tongue or oropharyngeal swelling  Eyes:      Extraocular Movements: Extraocular movements intact.   Cardiovascular:      Rate and Rhythm: Normal rate.   Pulmonary:      Effort: Pulmonary effort is normal.      Breath sounds: Normal breath sounds.   Abdominal:      General: Abdomen is flat.   Musculoskeletal:         General: Normal range of motion.   Skin:     General: Skin is warm.      Comments: No hives   Neurological:      General: No focal deficit present.      Mental Status: He is alert.   Psychiatric:         Mood and Affect: Mood normal.             ED Course  Labs Reviewed   COMP METABOLIC PANEL (14) - Abnormal; Notable for the following components:       Result Value    Glucose 108 (*)     All other components within normal limits   CBC WITH DIFFERENTIAL WITH PLATELET    RAINBOW DRAW BLUE                            [facility]     Differential Diagnosis  69-year-old male presents today for evaluation of lip swelling.  Patient is tolerating his secretions and does not appear to have any deeper oropharyngeal swelling.  His speech is clear.  He is on lisinopril, I suspect this could be an ACE induced angioedema.  He has no hives, vomiting, wheezing or clear exposure to suggest anaphylaxis.  Patient does say he had previous swelling to his lip, and suspects it was prior to starting lisinopril.  Differential includes ACE angioedema, hereditary angioedema, anaphylaxis.  Plan for steroids, antihistamines along with epi.  Will reassess.    12:49 am  Swelling is stable on my reassessment although patient feels like his lip is softening up.  With his persistent lip swelling, will admit to the hospital for continued monitoring.  I spoke with hospitalist of need for admission.    With the angioedema in the setting of patient taking lisinopril, I advised that he not take ACE inhibitors or ARB's anymore. He vocalized understanding    Discussions of Management   I spoke with hospitalist of need for admission.    Admission disposition: 6/8/2025  1:43 AM           Medical Decision Making      Disposition and Plan     Clinical Impression:  1. Angiotensin converting enzyme inhibitor-aggravated angioedema, initial encounter         Disposition:  Admit  6/8/2025  1:43 am    Follow-up:  No follow-up provider specified.        Medications Prescribed:  Current Discharge Medication List                Supplementary Documentation:         Hospital Problems       Present on Admission  Date Reviewed: 4/9/2025          ICD-10-CM Noted POA    * (Principal) Angiotensin converting enzyme inhibitor-aggravated angioedema, initial encounter T78.3XXA, T46.4X5A 6/8/2025 Unknown

## 2025-06-08 NOTE — ED QUICK NOTES
Orders for admission, patient is aware of plan and ready to go upstairs. Any questions, please call ED RN vineet at extension [phone].     Patient Covid vaccination status: Fully vaccinated     COVID Test Ordered in ED: None    COVID Suspicion at Admission: N/A    Running Infusions: Medication Infusions[1] None    Mental Status/LOC at time of transport: a&ox4    Other pertinent information:   CIWA score: N/A   NIH score:  N/A             [1]

## 2025-06-08 NOTE — PLAN OF CARE
Pt A&Ox4, on RA, VSS. Pt here for angio-edema. Denies difficulty breathing. No edema to tongue or throat. Edema to upper lip; improving. Voiding in bathroom. Dysphagia Screening completed. Plan: Monitor for worsening swelling, kept NOC for observation, Prednisone. POC discussed w/ pt, all questions answered. Call light within reach, safety parameters in place.

## 2025-06-08 NOTE — PLAN OF CARE
A&Ox4. VSS. On room air. Tolerating diet. Last BM 6/7. Voiding. Denies pain. Swelling improving to upper lip. Up adlib. Patient updated and in agreement with plan of care. Safety precautions in place. Instructed patient to call for assistance, call light within reach.

## 2025-06-08 NOTE — PLAN OF CARE
NURSING ADMISSION NOTE      Patient admitted via Cart from ED on RA  Oriented to room.  Safety precautions initiated.  Bed in low position.  Call light in reach.    2 person skin check completed w/ PCT Nitza

## 2025-06-09 ENCOUNTER — TELEPHONE (OUTPATIENT)
Dept: FAMILY MEDICINE CLINIC | Facility: CLINIC | Age: 70
End: 2025-06-09

## 2025-06-09 VITALS
HEART RATE: 56 BPM | BODY MASS INDEX: 27.09 KG/M2 | HEIGHT: 72 IN | OXYGEN SATURATION: 96 % | RESPIRATION RATE: 16 BRPM | SYSTOLIC BLOOD PRESSURE: 137 MMHG | WEIGHT: 200 LBS | TEMPERATURE: 98 F | DIASTOLIC BLOOD PRESSURE: 69 MMHG

## 2025-06-09 PROBLEM — J30.9 ALLERGIC RHINITIS: Status: ACTIVE | Noted: 2025-06-09

## 2025-06-09 LAB
ANION GAP SERPL CALC-SCNC: 12 MMOL/L (ref 0–18)
BUN BLD-MCNC: 18 MG/DL (ref 9–23)
CALCIUM BLD-MCNC: 9.5 MG/DL (ref 8.7–10.6)
CHLORIDE SERPL-SCNC: 104 MMOL/L (ref 98–112)
CO2 SERPL-SCNC: 22 MMOL/L (ref 21–32)
CREAT BLD-MCNC: 1.18 MG/DL (ref 0.7–1.3)
EGFRCR SERPLBLD CKD-EPI 2021: 66 ML/MIN/1.73M2 (ref 60–?)
GLUCOSE BLD-MCNC: 140 MG/DL (ref 70–99)
OSMOLALITY SERPL CALC.SUM OF ELEC: 290 MOSM/KG (ref 275–295)
POTASSIUM SERPL-SCNC: 4.1 MMOL/L (ref 3.5–5.1)
SODIUM SERPL-SCNC: 138 MMOL/L (ref 136–145)

## 2025-06-09 PROCEDURE — 99239 HOSP IP/OBS DSCHRG MGMT >30: CPT | Performed by: INTERNAL MEDICINE

## 2025-06-09 RX ORDER — CHLORTHALIDONE 25 MG/1
25 TABLET ORAL DAILY
Qty: 30 TABLET | Refills: 0 | Status: SHIPPED | OUTPATIENT
Start: 2025-06-10 | End: 2025-06-18

## 2025-06-09 RX ORDER — EPINEPHRINE 0.3 MG/.3ML
0.3 INJECTION SUBCUTANEOUS AS NEEDED
Qty: 1 EACH | Refills: 0 | Status: SHIPPED | OUTPATIENT
Start: 2025-06-09 | End: 2026-06-09

## 2025-06-09 NOTE — DISCHARGE SUMMARY
[facility]  Discharge Summary    Rigo Solomon Patient Status:  Observation    10/29/1955 MRN [id number]   Location [facility] 3SW-A Attending Diana Cast,    Hosp Day # 0 PCP Kaylin Lyon MD     Date of Admission: 2025    Date of Discharge: 2025      Disposition: Home or Self Care    Discharge Diagnosis:   Angioedema, possible ACE inhibitor induced angioedema  Hypertension  Allergic rhinitis, seasonal allergies    Chief Complaint:   Chief Complaint   Patient presents with    Allergic Rxn Allergies       History of Present Illness:   Rigo Solomon is a 69 year old male with PMHx HTN/ HLD/ erythrocytosis who presented to the hospital for lip swelling. He was doing gardening earlier in the day yesterday and then went to a family party. He notes eating some spicy foods b ut nothing out off the normal. He then had upper lip swelling. He had prior issues with lip swelling about 10 years ago so he took some benadryl and was seeing if that would fix it. He then felt a dryness to his throat and decided to seek medical eval. He needed to be hospitalized about 10 years ago but at that time it was worse with right facial swelling/ diffuse hives and this was before he was on lisinopril.  Please refer to history and physical done by Dr. Cast for details of admission    Brief Synopsis:   Patient treated with epinephrine injection, IV Solu-Medrol and IV Benadryl in ER for patient's angioedema with lip swelling and throat tightness.    Home lisinopril discontinued on admission due to possible angioedema due to ACE inhibitor.  Patient improved clinically.    Blood pressure started trending high and started on chlorthalidone which patient received 2 doses in the hospital and did well.  Discussed in detail regarding diuretic effect of the chlorthalidone and risk of dehydration and possibility of loss of electrolytes also with the diuretic effect and advised to stay hydrated with oral fluids with  electrolytes and to follow-up with regular outpatient primary care physician Kaylin Lyon MD for follow-up on this as outpatient; follow BMP in 1 week as outpatient with regular outpatient primary care physician to assess renal function and electrolytes while on chlorthalidone.  Advised blood pressure log at home and follow-up with regular outpatient primary care physician Kaylin Lyon MD   in office.  Patient states he takes Flonase as outpatient for allergic rhinitis and seasonal allergies and gets congestion periodically.   Steroids continued in hospital as oral prednisone and was given Benadryl and Pepcid.  Patient improved clinically.  No more throat tightness.  Lip swelling resolved.  Eager to go home today.  Discharged in stable condition.  Follow-up with regular outpatient primary care physician Kaylin Lyon MD within 1 week in office with blood pressure log at home, follow-up with allergy specialist as outpatient with history of recurrent episodes of angioedema and hives previously also according to patient.        TCM Diagnosis at discharge from Hospital: Other: See above; still recommend for TCM follow-up    Lace+ Score: 43  59-90 High Risk  29-58 Medium Risk  0-28   Low Risk.     TCM Follow-Up Recommendation:Rigo ALONSO 29-58: Moderate Risk of readmission after discharge from the hospital.      PCP: Kaylin Lyon MD        Procedures during hospitalization:   None    Incidental or significant findings and recommendations (brief descriptions):  None    Lab/Test results pending at Discharge:   None      Discharge Medications:        Discharge Medications        START taking these medications        Instructions Prescription details   chlorthalidone 25 MG Tabs  Commonly known as: [place]  Start taking on: Raeann 10, 2025      Take 1 tablet (25 mg total) by mouth daily.   Quantity: 30 tablet  Refills: 0     EPINEPHrine 0.3 MG/0.3ML Soaj  Commonly known as: EpiPen      Inject  0.3 mL (1 each total) into the muscle as needed.   Quantity: 1 each  Refills: 0            CONTINUE taking these medications        Instructions Prescription details   fluticasone propionate 50 MCG/ACT Susp  Commonly known as: Flonase      2 sprays by Each Nare route daily.   Refills: 0            STOP taking these medications      lisinopril 10 MG Tabs  Commonly known as: Zestril                  Where to Get Your Medications        These medications were sent to [id number] - [place], [place] - [address] [facility] [phone], [phone]  [address] [facility] [address]      Phone: [phone]   chlorthalidone 25 MG Tabs  EPINEPHrine 0.3 MG/0.3ML Soaj          [place] prescription monitoring program data reviewed in epic before prescribing narcotics/controlled substances: Not applicable as no narcotics prescribed    Follow up Visits: Follow-up with Kaylin Lyon MD in 1 week    Kaylin Lyon MD  [address] [place] [id number] [phone]    Schedule an appointment as soon as possible for a visit in 1 week(s)  Follow-up with regular outpatient primary care physician with blood pressure log at home.  Please obtain referral to allergy specialist as OP from your reg OP PCP.  BMP in 1 week as outpatient with regular outpatient primary care physician to follow-up on kidney function test and electrolytes while on chlorthalidone[New to diuretic antihypertensive]    Sue Corey  [address] [place] [id number] [phone]    Follow up      Appointments for Next 30 Days 6/9/2025 - 7/9/2025      None              Physical Exam:  /69 (BP Location: Right arm)   Pulse 56   Temp 97.6 °F (36.4 °C) (Oral)   Resp 16   Ht 6' (1.829 m)   Wt 200 lb (90.7 kg)   SpO2 96%   BMI 27.12 kg/m²       General appearance: alert and cooperative  Head: Normocephalic, without obvious abnormality, atraumatic  Throat: oral mucosa moist.  No lip swelling today  Neck: no adenopathy, no  carotid bruit, no JVD.   Lungs: clear to auscultation bilaterally  Heart: S1, S2 normal, no murmur,  regular rate and rhythm  Abdomen: soft, non-tender; bowel sounds normal  Extremities: extremities normal,no cyanosis or edema  Pulses: 2+ and symmetric  Skin: Skin color, texture, turgor normal. No rashes or lesions  Neurologic: Awake,alert,nonfocal  Psych: Mood and affect appears normal      Discharge Condition: stable    Patient Discharge Instructions: See electronic chart      More than 30 minutes on discharge    Sarahi Chavez MD  6/9/2025  10:30 AM

## 2025-06-09 NOTE — TELEPHONE ENCOUNTER
Transferred as a live call.  Patient was admitted in the hospital due to allergic reaction to lisinopril. Patient scheduled for hospital follow up on 6/18.

## 2025-06-09 NOTE — PLAN OF CARE
NURSING DISCHARGE NOTE    Discharged Home via Ambulatory.  Accompanied by Spouse  Belongings Taken by patient/family.    Pt DC to home with   Spouse. Pt erbalized understanding of all dc instructions and followup info.

## 2025-06-09 NOTE — PLAN OF CARE
A&oriented x4 . VSS. . IS encouraged. Telemetry monitoring. SCDs. Ankle pumps encouraged. Tolerating diet. Last BM 6/7. Voiding. Denies pain.Pt reports swelling improved.  Up ad libPlan is  DC home today. Patient updated and in agreement with plan of care. Safety precautions in place. Instructed patient to call for assistance, call light within reach.

## 2025-06-09 NOTE — TELEPHONE ENCOUNTER
Patient wants to get a substitute for lisinopril and he just got out from the hospital today and seeking a sooner appointment and he cannot wait til 07/01/25. He was at the hospital due to an allergic reaction to lisinopril. Please advise.

## 2025-06-09 NOTE — PLAN OF CARE
Alert and Oriented x4. On RA. VSS. Tele-NS. Denies pain at this time. Denies N/T. Voiding freely. Tolerating diet. Denies N/V. Call light within reach at this time.    Plan: Monitor swelling 24 hours, DC today 6/9

## 2025-06-09 NOTE — PROGRESS NOTES
Patient discharged home via family transport.  Discharge education taught, patient verbalizes understanding.  Belongings sent with patient.

## 2025-06-10 ENCOUNTER — PATIENT OUTREACH (OUTPATIENT)
Dept: CASE MANAGEMENT | Age: 70
End: 2025-06-10

## 2025-06-10 NOTE — PROGRESS NOTES
Transitional Care Management   Discharge Date: 25  Contact Date: 6/10/2025    Assessment:  TCM Initial Assessment    General:  Assessment completed with: Patient  Patient Subjective: Pt doing well says he's a little tired but better.  Chief Complaint: Angiotensin converting enzyme inhibitor-aggravated angioedema, initial encounter  Verify patient name and  with patient/ caregiver: Yes    Hospital Stay/Discharge:  Prior to leaving the hospital were your Discharge Instructions reviewed with you?: Yes  Did you receive a copy of your written Discharge Instructions?: Yes  Do you feel better or worse since you left the hospital or emergency department?: Better    Follow - Up Appointment:  Do you have a follow-up appointment?: Yes  Date: 25  Physician: Dr. Lyon  Are there any barriers to getting to your follow-up appointment?: No    Home Health/DME:  Prior to leaving the hospital was Home Health (HH) arranged for you?: No     Prior to leaving the hospital or emergency department was Durable Medical Equipment (DME), medical supplies, or infusions arranged for you?: No  Are DME/medical supply/infusions needs identified by staff during this assessment?: No     Medications/Diet:       Were you given a different diet per your Discharge Instructions?: No     Questions/Concerns:  Do you have any questions or concerns that have not been discussed?: No         Follow-up Appointments:  Your appointments       Date & Time Appointment Department (Center)    2025 11:00 AM T Hospital Follow Up with Kaylin Lyon MD [address] ([place] [address])              [address] [address]  [phone]            [facility]  Was TCC Ordered: No      Primary Care Provider (If no TCC appointment)  Does patient already have a PCP  appointment scheduled? Yes  Care Manager Confirmed PCP office TCM appointment with patient      Specialist  Does the patient have any other follow-up appointment(s) that need to be scheduled? Yes   -If yes: Care Manager reviewed upcoming specialist appointments with patient: Yes   -Does the patient need assistance scheduling appointment(s): No      Book By Date: 6/23/25

## 2025-06-18 ENCOUNTER — LAB ENCOUNTER (OUTPATIENT)
Dept: LAB | Age: 70
End: 2025-06-18
Attending: FAMILY MEDICINE
Payer: MEDICARE

## 2025-06-18 ENCOUNTER — OFFICE VISIT (OUTPATIENT)
Dept: FAMILY MEDICINE CLINIC | Facility: CLINIC | Age: 70
End: 2025-06-18
Payer: MEDICARE

## 2025-06-18 VITALS
DIASTOLIC BLOOD PRESSURE: 70 MMHG | WEIGHT: 203 LBS | HEIGHT: 72 IN | BODY MASS INDEX: 27.5 KG/M2 | SYSTOLIC BLOOD PRESSURE: 109 MMHG | RESPIRATION RATE: 18 BRPM | HEART RATE: 58 BPM | TEMPERATURE: 97 F

## 2025-06-18 DIAGNOSIS — I10 ESSENTIAL HYPERTENSION: ICD-10-CM

## 2025-06-18 DIAGNOSIS — D58.2 ELEVATED HEMOGLOBIN: ICD-10-CM

## 2025-06-18 DIAGNOSIS — E78.5 DYSLIPIDEMIA: ICD-10-CM

## 2025-06-18 DIAGNOSIS — T78.3XXA ANGIOEDEMA, INITIAL ENCOUNTER: Primary | ICD-10-CM

## 2025-06-18 DIAGNOSIS — R73.9 HYPERGLYCEMIA: ICD-10-CM

## 2025-06-18 DIAGNOSIS — R07.89 CHEST DISCOMFORT: ICD-10-CM

## 2025-06-18 DIAGNOSIS — R22.0 SWELLING OF UPPER LIP: ICD-10-CM

## 2025-06-18 LAB
ANION GAP SERPL CALC-SCNC: 13 MMOL/L (ref 0–18)
BUN BLD-MCNC: 24 MG/DL (ref 9–23)
CALCIUM BLD-MCNC: 9.9 MG/DL (ref 8.7–10.6)
CHLORIDE SERPL-SCNC: 97 MMOL/L (ref 98–112)
CO2 SERPL-SCNC: 27 MMOL/L (ref 21–32)
CREAT BLD-MCNC: 1.26 MG/DL (ref 0.7–1.3)
EGFRCR SERPLBLD CKD-EPI 2021: 61 ML/MIN/1.73M2 (ref 60–?)
FASTING STATUS PATIENT QL REPORTED: NO
GLUCOSE BLD-MCNC: 97 MG/DL (ref 70–99)
OSMOLALITY SERPL CALC.SUM OF ELEC: 288 MOSM/KG (ref 275–295)
POTASSIUM SERPL-SCNC: 3.5 MMOL/L (ref 3.5–5.1)
SODIUM SERPL-SCNC: 137 MMOL/L (ref 136–145)

## 2025-06-18 PROCEDURE — 99496 TRANSJ CARE MGMT HIGH F2F 7D: CPT | Performed by: FAMILY MEDICINE

## 2025-06-18 PROCEDURE — 36415 COLL VENOUS BLD VENIPUNCTURE: CPT

## 2025-06-18 PROCEDURE — 80048 BASIC METABOLIC PNL TOTAL CA: CPT

## 2025-06-18 RX ORDER — CHLORTHALIDONE 25 MG/1
25 TABLET ORAL DAILY
Qty: 90 TABLET | Refills: 0 | Status: SHIPPED | OUTPATIENT
Start: 2025-06-18

## 2025-06-18 NOTE — PATIENT INSTRUCTIONS
Continue on chlorthalidone 25 mg 1 tablet daily.  Monitor blood pressure at home.  Do blood work today.  Fill out sleep apnea questionnaire at home.  See allergist for evaluation.  Use Benadryl or EpiPen as needed if you develop swelling of the lips again closing of the throat sensation or shortness of breath/wheezing.  Proceed to emergency room  for evaluation after you you would use EpiPen.  Schedule medication check for September 2025.       VISIT SUMMARY:  During your visit, we discussed the sudden swelling of your upper lip, which occurred after a meal and was suspected to be an allergic reaction. We also reviewed your chest discomfort, dry mouth, and history of elevated hemoglobin. We have made some changes to your medication and provided instructions for further evaluation and management.    YOUR PLAN:  -ANGIOEDEMA: Angioedema is swelling that occurs beneath the skin, often due to an allergic reaction. We suspect your lip swelling might be related to your medication, lisinopril, or possibly a food or spice allergy. You should stop taking lisinopril and start taking chlorthalidone 25 mg instead. We recommend seeing an allergist for further evaluation. Keep diphenhydramine and your epinephrine auto-injector (Epipen) available for emergency use.    -CHEST DISCOMFORT: Your chest discomfort, which includes tightness and a burning sensation, may be related to allergies, indigestion, or respiratory issues. We have provided you with an albuterol inhaler to use as needed for chest tightness. Please monitor your symptoms and report to the allergist if the inhaler provides relief.    -DRY MOUTH: Dry mouth, or xerostomia, is a condition where your mouth feels unusually dry, especially at night. This has not impacted your daily activities, but you may continue to sip water as needed.    -ELEVATED HEMOGLOBIN: Elevated hemoglobin means having higher than normal levels of hemoglobin in your blood. Previous investigations  showed no significant findings, and your recent tests were normal. Please complete a sleep apnea questionnaire at home and bring it to your next visit. We will continue to monitor your hemoglobin levels with routine blood work.    -GENERAL HEALTH MAINTENANCE: Your blood pressure is well-controlled with your current medication. We will perform routine blood work today to monitor your electrolytes and refill your chlorthalidone prescription for 90 days. Continue with your current lifestyle and medication regimen.    INSTRUCTIONS:  Please follow up with an allergist for further evaluation of your angioedema and chest discomfort. Complete the sleep apnea questionnaire at home and bring it to your next visit. Monitor your symptoms and keep your emergency medications (diphenhydramine and Epipen) available. We will perform routine blood work today to monitor your electrolytes.    Contains text generated by Juan

## 2025-06-18 NOTE — PROGRESS NOTES
Subjective:   Rigo Solomon is a 69 year old male who presents for hospital follow up.       The following individual(s) verbally consented to be recorded using ambient AI listening technology and understand that they can each withdraw their consent to this listening technology at any point by asking the clinician to turn off or pause the recording:    Patient name: Rigo Solomon    He was discharged from [facility] to Home or Self Care  Admission Date: 6/7/25   Discharge Date: 6/9/25    Hospital Discharge Diagnosis:Angioedema, possible ACE inhibitor induced angioedema  Hypertension  Allergic rhinitis, seasonal allergies    Interactive contact within 2 business days post discharge first initiated on Date: 6/10/2025    I accessed [employer] and/or [employer] Everywhere and personally reviewed the following for the recent hospitalization: provider notes, consults, summaries, labs and other test results and the pertinent findings are documented below.     History of Present Illness  Rigo Solomon is a 69 year old male who presents hospital follow-up for lip swelling and possible allergic reaction.    He experienced sudden swelling of his upper lip during a family party on June 7th after consuming a large meal.  Patient was at his son's house.  The swelling did not respond to ice or Benadryl. Later that evening, he developed a scratchy throat, prompting him to visit the hospital around 11 PM. He takes lisinopril as his only medication, which was suspected to be the cause of the reaction despite having been on it for ten years without prior issues.    He consumed beef, chips with taco dip, and a chemo salad at the party. He also had a beer, which he rarely drinks. After returning from the hospital, he ate leftover beef and noticed a small pimple on his lower lip, which resolved after half an hour. He suspects a spice in the beef might have contributed to the reaction.    He recalls a similar episode of swelling and hives about  fifteen years ago, prior to starting lisinopril, which lasted two weeks and was never diagnosed. He has not experienced shortness of breath or wheezing but reports a burning sensation and tightness in his chest, particularly when waking up in the morning after sleeping or consuming spicy food. He uses Corricidin , which provides relief after about twenty minutes.    He experiences dryness at night, requiring water by his bedside. He remains active, swimming and walking without issues. He has not had further lip swelling or difficulty swallowing since the initial episode.    He has a history of high hemoglobin, which was investigated by a hematologist with no significant findings. He does not snore significantly and has not experienced gasping for air during sleep.  Fill out sleep apnea questionnaire and bring it for the next visit.    He was given an Epipen for emergency use. He has a history of grass seed allergy, which causes runny nose and itchy eyes during exposure.     Hypertension lisinopril was stopped during hospitalization patient started on chlorthalidone which he tolerates well patient is bringing readings for the visit blood pressure is stable, continue current medication.    History/Other:   Current Medications:  Medication Reconciliation:  I am aware of an inpatient discharge within the last 30 days.  The discharge medication list has been reconciled with the patient's current medication list and reviewed by me. See medication list for additions of new medication, and changes to current doses of medications and discontinued medications.    Outpatient Medications Marked as Taking for the 6/18/25 encounter (Office Visit) with Kaylin Lyon MD   Medication Sig    chlorthalidone 25 MG Oral Tab Take 1 tablet (25 mg total) by mouth daily.    EPINEPHrine 0.3 MG/0.3ML Injection Solution Auto-injector Inject 0.3 mL (1 each total) into the muscle as needed.    fluticasone propionate 50 MCG/ACT Nasal  Suspension 2 sprays by Each Nare route daily.       Review of Systems:  GENERAL: weight stable, energy stable, no sweating  SKIN: denies any unusual skin lesions  EYES: denies blurred vision or double vision  HEENT: denies nasal congestion, sinus pain or ST, no lip swelling which initially was reason for patient to be evaluated in the ER.  LUNGS: denies shortness of breath with exertion  CARDIOVASCULAR: denies chest pain on exertion or palpitations  GI: denies abdominal pain, denies heartburn, denies diarrhea  MUSCULOSKELETAL: denies pain, normal range of motion of extremities  NEURO: denies headaches, denies dizziness, denies weakness  PSYCHE: denies depression or anxiety  HEMATOLOGIC: denies hx of anemia, or bruising, denies bleeding  ENDOCRINE: denies thyroid history  ALL/ASTHMA: denies hx of allergy or asthma    Objective:   No LMP for male patient.  Estimated body mass index is 27.53 kg/m² as calculated from the following:    Height as of this encounter: 6' (1.829 m).    Weight as of this encounter: 203 lb (92.1 kg).   /70 (BP Location: Left arm, Patient Position: Sitting, Cuff Size: adult)   Pulse 58   Temp 96.8 °F (36 °C) (Temporal)   Resp 18   Ht 6' (1.829 m)   Wt 203 lb (92.1 kg)   BMI 27.53 kg/m²    GENERAL: well developed, well nourished, in no apparent distress  SKIN: no rashes, no suspicious lesions  HEENT: atraumatic, normocephalic, ears and throat are clear  EYES: PERRLA, EOMI, conjunctiva are clear  NECK: supple, no adenopathy,  CHEST: no chest tenderness  LUNGS: clear to auscultation  CARDIO: RRR without murmur  GI: good BS's, no masses, HSM or tenderness  MUSCULOSKELETAL: back is not tender, FROM of the extremities  EXTREMITIES: no cyanosis, clubbing or edema  NEURO: Oriented times three, cranial nerves are intact, motor and sensory are grossly intact      Results for orders placed or performed during the hospital encounter of 06/07/25   CBC With Differential With Platelet    Collection  Time: 06/07/25 10:42 PM   Result Value Ref Range    WBC 8.3 4.0 - 11.0 x10(3) uL    RBC 5.71 3.80 - 5.80 x10(6)uL    HGB 17.3 13.0 - 17.5 g/dL    HCT 50.4 39.0 - 53.0 %    .0 150.0 - 450.0 10(3)uL    MCV 88.3 80.0 - 100.0 fL    MCH 30.3 26.0 - 34.0 pg    MCHC 34.3 31.0 - 37.0 g/dL    RDW 13.5 %    Neutrophil Absolute Prelim 4.69 1.50 - 7.70 x10 (3) uL    Neutrophil Absolute 4.69 1.50 - 7.70 x10(3) uL    Lymphocyte Absolute 2.64 1.00 - 4.00 x10(3) uL    Monocyte Absolute 0.81 0.10 - 1.00 x10(3) uL    Eosinophil Absolute 0.13 0.00 - 0.70 x10(3) uL    Basophil Absolute 0.01 0.00 - 0.20 x10(3) uL    Immature Granulocyte Absolute 0.02 0.00 - 1.00 x10(3) uL    Neutrophil % 56.5 %    Lymphocyte % 31.8 %    Monocyte % 9.8 %    Eosinophil % 1.6 %    Basophil % 0.1 %    Immature Granulocyte % 0.2 %   Comp Metabolic Panel (14)    Collection Time: 06/07/25 10:42 PM   Result Value Ref Range    Glucose 108 (H) 70 - 99 mg/dL    Sodium 139 136 - 145 mmol/L    Potassium 4.1 3.5 - 5.1 mmol/L    Chloride 106 98 - 112 mmol/L    CO2 22.0 21.0 - 32.0 mmol/L    Anion Gap 11 0 - 18 mmol/L    BUN 23 9 - 23 mg/dL    Creatinine 1.17 0.70 - 1.30 mg/dL    Calcium, Total 9.4 8.7 - 10.6 mg/dL    Calculated Osmolality 292 275 - 295 mOsm/kg    eGFR-Cr 67 >=60 mL/min/1.73m2    AST 26 <34 U/L    ALT 23 10 - 49 U/L    Alkaline Phosphatase 87 45 - 117 U/L    Bilirubin, Total 0.5 0.2 - 1.1 mg/dL    Total Protein 7.4 5.7 - 8.2 g/dL    Albumin 4.6 3.2 - 4.8 g/dL    Globulin  2.8 2.0 - 3.5 g/dL    A/G Ratio 1.6 1.0 - 2.0   RAINBOW DRAW BLUE    Collection Time: 06/07/25 10:42 PM   Result Value Ref Range    Hold Blue Auto Resulted    Basic Metabolic Panel (8)    Collection Time: 06/09/25  5:34 AM   Result Value Ref Range    Glucose 140 (H) 70 - 99 mg/dL    Sodium 138 136 - 145 mmol/L    Potassium 4.1 3.5 - 5.1 mmol/L    Chloride 104 98 - 112 mmol/L    CO2 22.0 21.0 - 32.0 mmol/L    Anion Gap 12 0 - 18 mmol/L    BUN 18 9 - 23 mg/dL    Creatinine 1.18  0.70 - 1.30 mg/dL    Calcium, Total 9.5 8.7 - 10.6 mg/dL    Calculated Osmolality 290 275 - 295 mOsm/kg    eGFR-Cr 66 >=60 mL/min/1.73m2      Assessment & Plan:   1. Angioedema, initial encounter (Primary)  -     Allergy Referral - In Network  2. Swelling of upper lip  3. Essential hypertension  -     Basic Metabolic Panel (8); Future; Expected date: 06/18/2025  -     Chlorthalidone; Take 1 tablet (25 mg total) by mouth daily.  Dispense: 90 tablet; Refill: 0  4. Chest discomfort  5. Elevated hemoglobin    Continue on chlorthalidone 25 mg 1 tablet daily.  Monitor blood pressure at home.  Do blood work today.  Fill out sleep apnea questionnaire at home.  See allergist for evaluation.  Use Benadryl or EpiPen as needed if you develop swelling of the lips again closing of the throat sensation or shortness of breath/wheezing.  Proceed to emergency room  for evaluation after you you would use EpiPen.  Schedule medication check for September 2025.       VISIT SUMMARY:  During your visit, we discussed the sudden swelling of your upper lip, which occurred after a meal and was suspected to be an allergic reaction. We also reviewed your chest discomfort, dry mouth, and history of elevated hemoglobin. We have made some changes to your medication and provided instructions for further evaluation and management.    YOUR PLAN:  -ANGIOEDEMA: Angioedema is swelling that occurs beneath the skin, often due to an allergic reaction. We suspect your lip swelling might be related to your medication, lisinopril, or possibly a food or spice allergy. You should stop taking lisinopril and start taking chlorthalidone 25 mg instead. We recommend seeing an allergist for further evaluation. Keep diphenhydramine and your epinephrine auto-injector (Epipen) available for emergency use.    -CHEST DISCOMFORT: Your chest discomfort, which includes tightness and a burning sensation, may be related to allergies, indigestion, or respiratory issues. We have  provided you with an albuterol inhaler to use as needed for chest tightness. Please monitor your symptoms and report to the allergist if the inhaler provides relief.    -DRY MOUTH: Dry mouth, or xerostomia, is a condition where your mouth feels unusually dry, especially at night. This has not impacted your daily activities, but you may continue to sip water as needed.    -ELEVATED HEMOGLOBIN: Elevated hemoglobin means having higher than normal levels of hemoglobin in your blood. Previous investigations showed no significant findings, and your recent tests were normal. Please complete a sleep apnea questionnaire at home and bring it to your next visit. We will continue to monitor your hemoglobin levels with routine blood work.    -GENERAL HEALTH MAINTENANCE: Your blood pressure is well-controlled with your current medication. We will perform routine blood work today to monitor your electrolytes and refill your chlorthalidone prescription for 90 days. Continue with your current lifestyle and medication regimen.    INSTRUCTIONS:  Please follow up with an allergist for further evaluation of your angioedema and chest discomfort. Complete the sleep apnea questionnaire at home and bring it to your next visit. Monitor your symptoms and keep your emergency medications (diphenhydramine and Epipen) available. We will perform routine blood work today to monitor your electrolytes.    Contains text generated by Juan     No follow-ups on file.

## 2025-07-29 ENCOUNTER — LAB ENCOUNTER (OUTPATIENT)
Dept: LAB | Age: 70
End: 2025-07-29
Attending: FAMILY MEDICINE

## 2025-07-29 DIAGNOSIS — L50.1 IDIOPATHIC URTICARIA: Primary | ICD-10-CM

## 2025-07-29 PROCEDURE — 86140 C-REACTIVE PROTEIN: CPT

## 2025-07-29 PROCEDURE — 36415 COLL VENOUS BLD VENIPUNCTURE: CPT

## 2025-07-30 LAB — CRP SERPL-MCNC: 0.8 MG/DL (ref ?–0.5)

## 2025-08-06 ENCOUNTER — LAB ENCOUNTER (OUTPATIENT)
Dept: LAB | Age: 70
End: 2025-08-06
Attending: FAMILY MEDICINE

## 2025-08-06 DIAGNOSIS — I10 ESSENTIAL HYPERTENSION: ICD-10-CM

## 2025-08-06 DIAGNOSIS — R73.9 HYPERGLYCEMIA: ICD-10-CM

## 2025-08-06 DIAGNOSIS — E78.5 DYSLIPIDEMIA: ICD-10-CM

## 2025-08-06 DIAGNOSIS — L50.1 CHRONIC IDIOPATHIC URTICARIA: ICD-10-CM

## 2025-08-06 LAB — ERYTHROCYTE [SEDIMENTATION RATE] IN BLOOD: 26 MM/HR (ref 0–20)

## 2025-08-06 PROCEDURE — 36415 COLL VENOUS BLD VENIPUNCTURE: CPT

## 2025-08-06 PROCEDURE — 83520 IMMUNOASSAY QUANT NOS NONAB: CPT

## 2025-08-06 PROCEDURE — 82785 ASSAY OF IGE: CPT

## 2025-08-06 PROCEDURE — 85652 RBC SED RATE AUTOMATED: CPT

## 2025-08-07 LAB — IGE SERPL-ACNC: 7.21 KU/L (ref 2–214)

## 2025-08-12 LAB — TRYPTASE: 24 UG/L

## 2025-08-19 ENCOUNTER — LAB ENCOUNTER (OUTPATIENT)
Dept: LAB | Age: 70
End: 2025-08-19
Attending: FAMILY MEDICINE

## 2025-08-19 DIAGNOSIS — R73.9 HYPERGLYCEMIA: ICD-10-CM

## 2025-08-19 DIAGNOSIS — E78.5 DYSLIPIDEMIA: ICD-10-CM

## 2025-08-19 DIAGNOSIS — I10 ESSENTIAL HYPERTENSION: ICD-10-CM

## 2025-08-19 LAB
ALBUMIN SERPL-MCNC: 4.7 G/DL (ref 3.2–4.8)
ALBUMIN/GLOB SERPL: 1.6 (ref 1–2)
ALP LIVER SERPL-CCNC: 80 U/L (ref 45–117)
ALT SERPL-CCNC: 24 U/L (ref 10–49)
ANION GAP SERPL CALC-SCNC: 7 MMOL/L (ref 0–18)
AST SERPL-CCNC: 26 U/L (ref ?–34)
BILIRUB SERPL-MCNC: 1.6 MG/DL (ref 0.2–1.1)
BUN BLD-MCNC: 18 MG/DL (ref 9–23)
CALCIUM BLD-MCNC: 10.2 MG/DL (ref 8.7–10.6)
CHLORIDE SERPL-SCNC: 97 MMOL/L (ref 98–112)
CHOLEST SERPL-MCNC: 194 MG/DL (ref ?–200)
CO2 SERPL-SCNC: 32 MMOL/L (ref 21–32)
CREAT BLD-MCNC: 1.21 MG/DL (ref 0.7–1.3)
EGFRCR SERPLBLD CKD-EPI 2021: 64 ML/MIN/1.73M2 (ref 60–?)
EST. AVERAGE GLUCOSE BLD GHB EST-MCNC: 126 MG/DL (ref 68–126)
FASTING PATIENT LIPID ANSWER: YES
FASTING STATUS PATIENT QL REPORTED: YES
GLOBULIN PLAS-MCNC: 3 G/DL (ref 2–3.5)
GLUCOSE BLD-MCNC: 99 MG/DL (ref 70–99)
HBA1C MFR BLD: 6 % (ref ?–5.7)
HDLC SERPL-MCNC: 48 MG/DL (ref 40–59)
LDLC SERPL CALC-MCNC: 118 MG/DL (ref ?–100)
NONHDLC SERPL-MCNC: 146 MG/DL (ref ?–130)
OSMOLALITY SERPL CALC.SUM OF ELEC: 284 MOSM/KG (ref 275–295)
POTASSIUM SERPL-SCNC: 3.7 MMOL/L (ref 3.5–5.1)
PROT SERPL-MCNC: 7.7 G/DL (ref 5.7–8.2)
SODIUM SERPL-SCNC: 136 MMOL/L (ref 136–145)
TRIGL SERPL-MCNC: 157 MG/DL (ref 30–149)
VLDLC SERPL CALC-MCNC: 28 MG/DL (ref 0–30)

## 2025-08-19 PROCEDURE — 83036 HEMOGLOBIN GLYCOSYLATED A1C: CPT

## 2025-08-19 PROCEDURE — 80061 LIPID PANEL: CPT

## 2025-08-19 PROCEDURE — 80053 COMPREHEN METABOLIC PANEL: CPT

## 2025-08-19 PROCEDURE — 36415 COLL VENOUS BLD VENIPUNCTURE: CPT

## 2025-08-26 ENCOUNTER — OFFICE VISIT (OUTPATIENT)
Dept: FAMILY MEDICINE CLINIC | Facility: CLINIC | Age: 70
End: 2025-08-26

## 2025-08-26 VITALS
TEMPERATURE: 97 F | HEART RATE: 56 BPM | WEIGHT: 198 LBS | HEIGHT: 72 IN | DIASTOLIC BLOOD PRESSURE: 77 MMHG | BODY MASS INDEX: 26.82 KG/M2 | SYSTOLIC BLOOD PRESSURE: 104 MMHG | RESPIRATION RATE: 18 BRPM

## 2025-08-26 DIAGNOSIS — L50.9 HIVES: ICD-10-CM

## 2025-08-26 DIAGNOSIS — R73.9 HYPERGLYCEMIA: ICD-10-CM

## 2025-08-26 DIAGNOSIS — I10 ESSENTIAL HYPERTENSION: Primary | ICD-10-CM

## 2025-08-26 DIAGNOSIS — E78.5 DYSLIPIDEMIA: ICD-10-CM

## 2025-08-26 DIAGNOSIS — J02.9 SORE THROAT: ICD-10-CM

## 2025-08-26 DIAGNOSIS — R97.20 ELEVATED PSA: ICD-10-CM

## 2025-08-26 PROCEDURE — 99214 OFFICE O/P EST MOD 30 MIN: CPT | Performed by: FAMILY MEDICINE

## 2025-08-26 PROCEDURE — G2211 COMPLEX E/M VISIT ADD ON: HCPCS | Performed by: FAMILY MEDICINE

## 2025-08-26 RX ORDER — CHLORTHALIDONE 25 MG/1
25 TABLET ORAL DAILY
Qty: 90 TABLET | Refills: 1 | Status: SHIPPED | OUTPATIENT
Start: 2025-08-26

## 2025-08-26 RX ORDER — PREDNISONE 5 MG/1
TABLET ORAL
COMMUNITY
Start: 2025-08-06

## (undated) NOTE — MR AVS SNAPSHOT
[facility] [address], [address] [phone]               Thank you for choosing us for your health care visit with Robert Singh MD.  We are glad to serve you and happy to provide you with this mendoza CBC W DIFFERENTIAL W PLATELET    Complete by: Mar 18, 2017 (Approximate)    Assoc Dx: Toe erythema [L53.9], Toe swelling [M79.89], Essential hypertension [I10], Pain in both feet [M79.671, M79.672]           COMP METABOLIC PANEL    Complete by:   Mar 18, MyChart     Visit [employer]  You can access your MyChart to more actively manage your health care and view more details from this visit by going to [email or web address].   If you've recently had a stay at the Hospital you can access your disc Don’t forget strength training with weights and resistance Set goals and track your progress   You don’t need to join a gym. Home exercises work great.  Put more priority on exercise in your life                    Visit [facility] online at

## (undated) NOTE — LETTER
6/4/2018      Kendra Duty   [address] [facility] [address]    Dear Mr. Karrie Fallon,     [address] [facility] office has been trying to contact you to schedule a visit with your doctor to address important healthcare needs. It is important that you contact our office at your earliest convenience. Also,  Did you know that you can receive test result information and reminders securely on line through [address] [place] [address] [address]? To register for [employer], open your Internet browser and go to [email or web address] and click \"sign up now\". Follow the on-screen instructions to complete your registration. Thank you for your prompt attention to this matter. You may reach our office at [phone].      Sincerely,      [employer] and Staff

## (undated) NOTE — MR AVS SNAPSHOT
[facility] [address], [address] [phone]               Thank you for choosing us for your health care visit with Hoda Vigil MD.  We are glad to serve you and happy to provide you with this mendoza Visit [employer]  You can access your MyChart to more actively manage your health care and view more details from this visit by going to [email or web address].   If you've recently had a stay at the Hospital you can access your discharge instructions i

## (undated) NOTE — Clinical Note
Hi Dr. Lyon, pt feeling better since discharge.  Has scheduled visit with you.  Thank you, Margot